# Patient Record
Sex: FEMALE | Race: WHITE | Employment: OTHER | URBAN - METROPOLITAN AREA
[De-identification: names, ages, dates, MRNs, and addresses within clinical notes are randomized per-mention and may not be internally consistent; named-entity substitution may affect disease eponyms.]

---

## 2019-01-17 ENCOUNTER — OFFICE VISIT (OUTPATIENT)
Dept: FAMILY MEDICINE CLINIC | Age: 49
End: 2019-01-17
Payer: COMMERCIAL

## 2019-01-17 VITALS
RESPIRATION RATE: 12 BRPM | SYSTOLIC BLOOD PRESSURE: 136 MMHG | HEIGHT: 65 IN | WEIGHT: 136 LBS | BODY MASS INDEX: 22.66 KG/M2 | HEART RATE: 84 BPM | TEMPERATURE: 98.7 F | DIASTOLIC BLOOD PRESSURE: 82 MMHG

## 2019-01-17 DIAGNOSIS — Z12.31 SCREENING MAMMOGRAM, ENCOUNTER FOR: ICD-10-CM

## 2019-01-17 DIAGNOSIS — N95.1 PERIMENOPAUSAL SYMPTOM: ICD-10-CM

## 2019-01-17 DIAGNOSIS — Z11.4 SCREENING FOR HIV WITHOUT PRESENCE OF RISK FACTORS: ICD-10-CM

## 2019-01-17 DIAGNOSIS — Z00.00 WELL ADULT EXAM: Primary | ICD-10-CM

## 2019-01-17 PROCEDURE — 99386 PREV VISIT NEW AGE 40-64: CPT | Performed by: FAMILY MEDICINE

## 2019-01-17 ASSESSMENT — ENCOUNTER SYMPTOMS
ALLERGIC/IMMUNOLOGIC NEGATIVE: 1
GASTROINTESTINAL NEGATIVE: 1
RESPIRATORY NEGATIVE: 1
EYES NEGATIVE: 1

## 2019-01-17 ASSESSMENT — PATIENT HEALTH QUESTIONNAIRE - PHQ9
SUM OF ALL RESPONSES TO PHQ QUESTIONS 1-9: 0
2. FEELING DOWN, DEPRESSED OR HOPELESS: 0
SUM OF ALL RESPONSES TO PHQ QUESTIONS 1-9: 0
SUM OF ALL RESPONSES TO PHQ9 QUESTIONS 1 & 2: 0
1. LITTLE INTEREST OR PLEASURE IN DOING THINGS: 0

## 2019-01-22 ENCOUNTER — OFFICE VISIT (OUTPATIENT)
Dept: FAMILY MEDICINE CLINIC | Age: 49
End: 2019-01-22
Payer: COMMERCIAL

## 2019-01-22 VITALS
SYSTOLIC BLOOD PRESSURE: 116 MMHG | TEMPERATURE: 98.2 F | BODY MASS INDEX: 24.32 KG/M2 | WEIGHT: 146 LBS | DIASTOLIC BLOOD PRESSURE: 70 MMHG | RESPIRATION RATE: 15 BRPM | HEART RATE: 78 BPM | HEIGHT: 65 IN

## 2019-01-22 DIAGNOSIS — Z01.419 PAP SMEAR, AS PART OF ROUTINE GYNECOLOGICAL EXAMINATION: ICD-10-CM

## 2019-01-22 DIAGNOSIS — Z12.31 ENCOUNTER FOR SCREENING MAMMOGRAM FOR MALIGNANT NEOPLASM OF BREAST: ICD-10-CM

## 2019-01-22 DIAGNOSIS — Z12.4 SCREENING FOR CERVICAL CANCER: ICD-10-CM

## 2019-01-22 DIAGNOSIS — Z01.419 PAP SMEAR, AS PART OF ROUTINE GYNECOLOGICAL EXAMINATION: Primary | ICD-10-CM

## 2019-01-22 PROCEDURE — 99396 PREV VISIT EST AGE 40-64: CPT | Performed by: NURSE PRACTITIONER

## 2019-01-22 ASSESSMENT — ENCOUNTER SYMPTOMS
TROUBLE SWALLOWING: 0
RESPIRATORY NEGATIVE: 1
BLOOD IN STOOL: 0
ABDOMINAL PAIN: 0
ALLERGIC/IMMUNOLOGIC NEGATIVE: 1
VOICE CHANGE: 0
SHORTNESS OF BREATH: 0
GASTROINTESTINAL NEGATIVE: 1
COLOR CHANGE: 0
ANAL BLEEDING: 0
RECTAL PAIN: 0
CONSTIPATION: 0
EYES NEGATIVE: 1
DIARRHEA: 0

## 2019-02-25 ENCOUNTER — OFFICE VISIT (OUTPATIENT)
Dept: FAMILY MEDICINE CLINIC | Age: 49
End: 2019-02-25
Payer: COMMERCIAL

## 2019-02-25 VITALS
SYSTOLIC BLOOD PRESSURE: 120 MMHG | WEIGHT: 137 LBS | TEMPERATURE: 97.6 F | BODY MASS INDEX: 22.02 KG/M2 | RESPIRATION RATE: 16 BRPM | DIASTOLIC BLOOD PRESSURE: 80 MMHG | OXYGEN SATURATION: 98 % | HEIGHT: 66 IN | HEART RATE: 95 BPM

## 2019-02-25 DIAGNOSIS — J10.1 INFLUENZA A: Primary | ICD-10-CM

## 2019-02-25 PROCEDURE — 99213 OFFICE O/P EST LOW 20 MIN: CPT | Performed by: FAMILY MEDICINE

## 2019-02-25 RX ORDER — OSELTAMIVIR PHOSPHATE 75 MG/1
75 CAPSULE ORAL
COMMUNITY
Start: 2019-02-21 | End: 2019-02-26

## 2019-02-25 RX ORDER — MECLIZINE HYDROCHLORIDE 25 MG/1
25 TABLET ORAL 3 TIMES DAILY PRN
Qty: 30 TABLET | Refills: 0 | Status: SHIPPED | OUTPATIENT
Start: 2019-02-25 | End: 2019-03-07

## 2019-02-25 RX ORDER — ALBUTEROL SULFATE 90 UG/1
2 AEROSOL, METERED RESPIRATORY (INHALATION)
COMMUNITY
Start: 2019-02-21 | End: 2019-03-23

## 2019-02-25 RX ORDER — PREDNISONE 20 MG/1
20 TABLET ORAL
COMMUNITY
Start: 2019-02-21 | End: 2019-02-26

## 2019-02-25 ASSESSMENT — ENCOUNTER SYMPTOMS
GASTROINTESTINAL NEGATIVE: 1
RHINORRHEA: 1
COUGH: 1

## 2019-04-05 ENCOUNTER — TELEPHONE (OUTPATIENT)
Dept: FAMILY MEDICINE CLINIC | Age: 49
End: 2019-04-05

## 2020-12-09 LAB
BUN BLDV-MCNC: NORMAL MG/DL
CALCIUM SERPL-MCNC: NORMAL MG/DL
CHLORIDE BLD-SCNC: NORMAL MMOL/L
CO2: NORMAL
CREAT SERPL-MCNC: NORMAL MG/DL
GFR CALCULATED: NORMAL
GLUCOSE BLD-MCNC: NORMAL MG/DL
POTASSIUM SERPL-SCNC: NORMAL MMOL/L
SODIUM BLD-SCNC: NORMAL MMOL/L

## 2023-05-12 PROBLEM — J44.9 CHRONIC OBSTRUCTIVE LUNG DISEASE (MULTI): Status: ACTIVE | Noted: 2023-05-12

## 2023-05-12 PROBLEM — I10 HYPERTENSION: Status: ACTIVE | Noted: 2021-09-22

## 2023-05-12 PROBLEM — R79.89 POSITIVE D DIMER: Status: ACTIVE | Noted: 2021-09-21

## 2023-05-12 PROBLEM — F17.200 TOBACCO USE DISORDER: Status: ACTIVE | Noted: 2023-05-12

## 2023-05-12 PROBLEM — E78.5 HYPERLIPIDEMIA: Status: ACTIVE | Noted: 2023-05-12

## 2023-05-12 PROBLEM — E55.9 VITAMIN D DEFICIENCY: Status: ACTIVE | Noted: 2023-05-12

## 2023-05-12 NOTE — PROGRESS NOTES
Subjective   Patient ID: Kerri Comer is a 52 y.o. female who presents for Joint Pain (Hands and ankles ).    Comes into the office today complaining of global /diffuse arthralgias worst in the hips, knees, ankles and hands but also in the shoulders and in the back.  Generally worse after sitting or resting for little prolonged.  As she is stiffer after resting and they do tend to loosen up after she has been active for about 30 minutes.  She has been utilizing OTC Tylenol and nonsteroidals with slight improvement in symptomatology.    She continues to smoke about the same.    Arthritis  Presents for initial visit. The disease course has been worsening. She complains of pain and stiffness. She reports no joint swelling or joint warmth. Affected locations include the neck, left shoulder, right DIP, left DIP, right MCP, left MCP, right hip, left hip, right knee, left knee, right ankle and left ankle. Her pain is at a severity of 5/10. Associated symptoms include fatigue. Pertinent negatives include no diarrhea, dry eyes, dry mouth, dysuria, fever, pain at night, pain while resting, rash, Raynaud's syndrome, uveitis or weight loss. Her past medical history is significant for chronic back pain and osteoarthritis. There is no history of lupus, psoriasis or rheumatoid arthritis.   Risk factors do not include overuse or scoliosis. Her family medical history includes family history of chronic back pain, family history of osteoarthritis and family history of rheumatoid arthritis. Past treatments include acetaminophen, aspirin, NSAIDs, activity and heat. The treatment provided mild relief. Exacerbated by: Rest-tends to stiffen. Compliance with prior treatments has been good.        Review of Systems   Constitutional:  Positive for fatigue. Negative for activity change, appetite change, chills, diaphoresis, fever, unexpected weight change and weight loss.   HENT:  Negative for congestion, ear pain, hearing loss, nosebleeds,  "postnasal drip, rhinorrhea, sinus pressure, sneezing, sore throat, tinnitus, trouble swallowing and voice change.    Eyes:  Negative for photophobia, pain, discharge, redness, itching and visual disturbance.   Respiratory:  Negative for cough, choking, chest tightness, shortness of breath and wheezing.    Cardiovascular:  Negative for chest pain, palpitations and leg swelling.   Gastrointestinal:  Negative for abdominal distention, abdominal pain, blood in stool, constipation, diarrhea, nausea and vomiting.   Endocrine: Negative for cold intolerance, heat intolerance, polydipsia and polyuria.   Genitourinary:  Negative for dysuria, flank pain, frequency, hematuria and urgency.   Musculoskeletal:  Positive for arthritis and stiffness. Negative for arthralgias, back pain, joint swelling, myalgias, neck pain and neck stiffness.   Skin:  Negative for rash and wound.   Allergic/Immunologic: Negative for immunocompromised state.   Neurological:  Negative for dizziness, tremors, seizures, syncope, facial asymmetry, speech difficulty, weakness, light-headedness, numbness and headaches.   Hematological:  Negative for adenopathy. Does not bruise/bleed easily.   Psychiatric/Behavioral:  Negative for agitation, behavioral problems, confusion, dysphoric mood, hallucinations, self-injury, sleep disturbance and suicidal ideas. The patient is not nervous/anxious.        Objective   BP (!) 138/102 (BP Location: Left arm, Patient Position: Sitting, BP Cuff Size: Adult)   Pulse 89   Temp 36.7 °C (98.1 °F) (Temporal)   Resp 16   Ht 1.6 m (5' 3\")   Wt 62.6 kg (138 lb)   BMI 24.45 kg/m²     Physical Exam  Constitutional:       General: She is not in acute distress.     Appearance: She is not ill-appearing or diaphoretic.   HENT:      Head: Normocephalic and atraumatic.      Right Ear: External ear normal.      Left Ear: External ear normal.      Nose: Nose normal. No rhinorrhea.   Eyes:      General: Lids are normal. No scleral " icterus.        Right eye: No discharge.         Left eye: No discharge.      Conjunctiva/sclera: Conjunctivae normal.   Cardiovascular:      Rate and Rhythm: Normal rate and regular rhythm.      Pulses: Normal pulses.      Heart sounds: No murmur heard.  Pulmonary:      Effort: Pulmonary effort is normal. No respiratory distress.      Breath sounds: No decreased breath sounds, wheezing, rhonchi or rales.   Abdominal:      General: Bowel sounds are normal. There is no distension.      Palpations: Abdomen is soft. There is no mass.      Tenderness: There is no abdominal tenderness. There is no guarding or rebound.   Musculoskeletal:         General: No swelling or tenderness.      Cervical back: No rigidity or tenderness.      Right lower leg: No edema.      Left lower leg: No edema.      Comments: Diffuse arthritic deformities noted   Lymphadenopathy:      Cervical: No cervical adenopathy.      Upper Body:      Right upper body: No supraclavicular adenopathy.      Left upper body: No supraclavicular adenopathy.   Skin:     General: Skin is warm and dry.      Coloration: Skin is not jaundiced or pale.      Findings: No erythema, lesion or rash.   Neurological:      General: No focal deficit present.      Mental Status: She is alert and oriented to person, place, and time.      Sensory: No sensory deficit.      Motor: No weakness or tremor.      Coordination: Coordination normal.      Gait: Gait normal.   Psychiatric:         Mood and Affect: Mood normal. Affect is not inappropriate.         Behavior: Behavior normal.       Assessment/Plan   Diagnoses and all orders for this visit:  Encounter for screening mammogram for malignant neoplasm of breast  -     BI mammo bilateral screening tomosynthesis; Future  -     Follow Up In [facility] - PCP; Future  Chronic obstructive pulmonary disease, unspecified COPD type (CMS/HCC)  -     Follow Up In [facility] - PCP; Future  Arthralgia of multiple  joints  -     AYANA with Reflex to RADHA; Future  -     C-Reactive Protein; Future  -     Creatine Kinase; Future  -     Rheumatoid Factor; Future  -     Sedimentation Rate; Future  -     Uric Acid; Future  -     Vitamin D, Total; Future  -     XR shoulder left 2+ views; Future  -     XR ankle left 3+ views; Future  -     XR knee right 3 views; Future  -     XR hand right 3+ views; Future  -     Follow Up In [facility] - PCP; Future  -     meloxicam (Mobic) 15 mg tablet; Take 1 tablet (15 mg) by mouth once daily.  Tobacco use disorder  -     Follow Up In [facility] - PCP; Future  Primary hypertension  -     CBC and Auto Differential; Future  -     Comprehensive Metabolic Panel; Future  -     Lipid Panel; Future  -     Magnesium; Future  -     TSH with reflex to Free T4 if abnormal; Future  -     Follow Up In [facility] - PCP; Future  Vitamin D deficiency  -     Vitamin D, Total; Future  -     Follow Up In [facility] - PCP; Future    Smoking cessation discussed.  Patient stated understanding and acceptance of risks of continued tobacco use.  Check basic rheumatologic work-up and survey x-rays.  Start meloxicam 15 daily and follow-up in a month to review x-rays and rheumatologic eval

## 2023-05-24 ENCOUNTER — OFFICE VISIT (OUTPATIENT)
Dept: PRIMARY CARE | Facility: CLINIC | Age: 53
End: 2023-05-24
Payer: COMMERCIAL

## 2023-05-24 VITALS
HEART RATE: 89 BPM | BODY MASS INDEX: 24.45 KG/M2 | HEIGHT: 63 IN | TEMPERATURE: 98.1 F | DIASTOLIC BLOOD PRESSURE: 102 MMHG | RESPIRATION RATE: 16 BRPM | WEIGHT: 138 LBS | SYSTOLIC BLOOD PRESSURE: 138 MMHG

## 2023-05-24 DIAGNOSIS — E55.9 VITAMIN D DEFICIENCY: ICD-10-CM

## 2023-05-24 DIAGNOSIS — J44.9 CHRONIC OBSTRUCTIVE PULMONARY DISEASE, UNSPECIFIED COPD TYPE (MULTI): ICD-10-CM

## 2023-05-24 DIAGNOSIS — I10 PRIMARY HYPERTENSION: ICD-10-CM

## 2023-05-24 DIAGNOSIS — M25.50 ARTHRALGIA OF MULTIPLE JOINTS: ICD-10-CM

## 2023-05-24 DIAGNOSIS — Z12.31 ENCOUNTER FOR SCREENING MAMMOGRAM FOR MALIGNANT NEOPLASM OF BREAST: Primary | ICD-10-CM

## 2023-05-24 DIAGNOSIS — F17.200 TOBACCO USE DISORDER: ICD-10-CM

## 2023-05-24 PROCEDURE — 3075F SYST BP GE 130 - 139MM HG: CPT | Performed by: FAMILY MEDICINE

## 2023-05-24 PROCEDURE — 99214 OFFICE O/P EST MOD 30 MIN: CPT | Performed by: FAMILY MEDICINE

## 2023-05-24 PROCEDURE — 3080F DIAST BP >= 90 MM HG: CPT | Performed by: FAMILY MEDICINE

## 2023-05-24 PROCEDURE — 4004F PT TOBACCO SCREEN RCVD TLK: CPT | Performed by: FAMILY MEDICINE

## 2023-05-24 RX ORDER — MELOXICAM 15 MG/1
15 TABLET ORAL DAILY
Qty: 30 TABLET | Refills: 11 | Status: SHIPPED | OUTPATIENT
Start: 2023-05-24 | End: 2024-03-26 | Stop reason: WASHOUT

## 2023-05-24 ASSESSMENT — ENCOUNTER SYMPTOMS
PHOTOPHOBIA: 0
EYE ITCHING: 0
CONSTIPATION: 0
OVERUSE: 0
JOINT WARMTH: 0
WEAKNESS: 0
PAIN AT NIGHT: 0
CHILLS: 0
APPETITE CHANGE: 0
ABDOMINAL DISTENTION: 0
SHORTNESS OF BREATH: 0
EYE PAIN: 0
UNEXPECTED WEIGHT CHANGE: 0
ADENOPATHY: 0
CHOKING: 0
POLYDIPSIA: 0
STIFFNESS: 1
JOINT SWELLING: 0
ARTHRALGIAS: 0
NECK STIFFNESS: 0
HEADACHES: 0
EYE DISCHARGE: 0
FLANK PAIN: 0
FREQUENCY: 0
COUGH: 0
NAUSEA: 0
FATIGUE: 1
TREMORS: 0
VOMITING: 0
LIGHT-HEADEDNESS: 0
WHEEZING: 0
TROUBLE SWALLOWING: 0
BACK PAIN: 0
SLEEP DISTURBANCE: 0
DYSPHORIC MOOD: 0
DIAPHORESIS: 0
FACIAL ASYMMETRY: 0
AGITATION: 0
SINUS PRESSURE: 0
NUMBNESS: 0
WOUND: 0
SORE THROAT: 0
VOICE CHANGE: 0
ACTIVITY CHANGE: 0
BRUISES/BLEEDS EASILY: 0
PAIN WHILE RESTING: 0
MYALGIAS: 0
SEIZURES: 0
WEIGHT LOSS: 0
PALPITATIONS: 0
HEMATURIA: 0
ABDOMINAL PAIN: 0
CONFUSION: 0
EYE REDNESS: 0
HALLUCINATIONS: 0
CHEST TIGHTNESS: 0
NECK PAIN: 0
NERVOUS/ANXIOUS: 0
SPEECH DIFFICULTY: 0
FEVER: 0
DYSURIA: 0
DIARRHEA: 0
BLOOD IN STOOL: 0
RHINORRHEA: 0
DIZZINESS: 0

## 2023-06-05 ENCOUNTER — LAB (OUTPATIENT)
Dept: LAB | Facility: LAB | Age: 53
End: 2023-06-05
Payer: COMMERCIAL

## 2023-06-05 DIAGNOSIS — I10 PRIMARY HYPERTENSION: ICD-10-CM

## 2023-06-05 DIAGNOSIS — M25.50 ARTHRALGIA OF MULTIPLE JOINTS: ICD-10-CM

## 2023-06-05 DIAGNOSIS — E55.9 VITAMIN D DEFICIENCY: ICD-10-CM

## 2023-06-05 LAB
ALANINE AMINOTRANSFERASE (SGPT) (U/L) IN SER/PLAS: 12 U/L (ref 7–45)
ALBUMIN (G/DL) IN SER/PLAS: 4.5 G/DL (ref 3.4–5)
ALKALINE PHOSPHATASE (U/L) IN SER/PLAS: 83 U/L (ref 33–110)
ANION GAP IN SER/PLAS: 9 MMOL/L (ref 10–20)
ASPARTATE AMINOTRANSFERASE (SGOT) (U/L) IN SER/PLAS: 15 U/L (ref 9–39)
BASOPHILS (10*3/UL) IN BLOOD BY AUTOMATED COUNT: 0.05 X10E9/L (ref 0–0.1)
BASOPHILS/100 LEUKOCYTES IN BLOOD BY AUTOMATED COUNT: 0.8 % (ref 0–2)
BILIRUBIN TOTAL (MG/DL) IN SER/PLAS: 0.4 MG/DL (ref 0–1.2)
C REACTIVE PROTEIN (MG/L) IN SER/PLAS: 0.24 MG/DL
CALCIDIOL (25 OH VITAMIN D3) (NG/ML) IN SER/PLAS: 32 NG/ML
CALCIUM (MG/DL) IN SER/PLAS: 9.1 MG/DL (ref 8.6–10.3)
CARBON DIOXIDE, TOTAL (MMOL/L) IN SER/PLAS: 30 MMOL/L (ref 21–32)
CHLORIDE (MMOL/L) IN SER/PLAS: 106 MMOL/L (ref 98–107)
CHOLESTEROL (MG/DL) IN SER/PLAS: 223 MG/DL (ref 0–199)
CHOLESTEROL IN HDL (MG/DL) IN SER/PLAS: 68.1 MG/DL
CHOLESTEROL/HDL RATIO: 3.3
CREATINE KINASE (U/L) IN SER/PLAS: 64 U/L (ref 0–215)
CREATININE (MG/DL) IN SER/PLAS: 0.82 MG/DL (ref 0.5–1.05)
EOSINOPHILS (10*3/UL) IN BLOOD BY AUTOMATED COUNT: 0.11 X10E9/L (ref 0–0.7)
EOSINOPHILS/100 LEUKOCYTES IN BLOOD BY AUTOMATED COUNT: 1.8 % (ref 0–6)
ERYTHROCYTE DISTRIBUTION WIDTH (RATIO) BY AUTOMATED COUNT: 13.5 % (ref 11.5–14.5)
ERYTHROCYTE MEAN CORPUSCULAR HEMOGLOBIN CONCENTRATION (G/DL) BY AUTOMATED: 33 G/DL (ref 32–36)
ERYTHROCYTE MEAN CORPUSCULAR VOLUME (FL) BY AUTOMATED COUNT: 100 FL (ref 80–100)
ERYTHROCYTES (10*6/UL) IN BLOOD BY AUTOMATED COUNT: 4.51 X10E12/L (ref 4–5.2)
GFR FEMALE: 86 ML/MIN/1.73M2
GLUCOSE (MG/DL) IN SER/PLAS: 92 MG/DL (ref 74–99)
HEMATOCRIT (%) IN BLOOD BY AUTOMATED COUNT: 45.1 % (ref 36–46)
HEMOGLOBIN (G/DL) IN BLOOD: 14.9 G/DL (ref 12–16)
IMMATURE GRANULOCYTES/100 LEUKOCYTES IN BLOOD BY AUTOMATED COUNT: 0.2 % (ref 0–0.9)
LDL: 138 MG/DL (ref 0–99)
LEUKOCYTES (10*3/UL) IN BLOOD BY AUTOMATED COUNT: 6.1 X10E9/L (ref 4.4–11.3)
LYMPHOCYTES (10*3/UL) IN BLOOD BY AUTOMATED COUNT: 1.95 X10E9/L (ref 1.2–4.8)
LYMPHOCYTES/100 LEUKOCYTES IN BLOOD BY AUTOMATED COUNT: 32.2 % (ref 13–44)
MAGNESIUM (MG/DL) IN SER/PLAS: 2.16 MG/DL (ref 1.6–2.4)
MONOCYTES (10*3/UL) IN BLOOD BY AUTOMATED COUNT: 0.38 X10E9/L (ref 0.1–1)
MONOCYTES/100 LEUKOCYTES IN BLOOD BY AUTOMATED COUNT: 6.3 % (ref 2–10)
NEUTROPHILS (10*3/UL) IN BLOOD BY AUTOMATED COUNT: 3.56 X10E9/L (ref 1.2–7.7)
NEUTROPHILS/100 LEUKOCYTES IN BLOOD BY AUTOMATED COUNT: 58.7 % (ref 40–80)
PLATELETS (10*3/UL) IN BLOOD AUTOMATED COUNT: 219 X10E9/L (ref 150–450)
POTASSIUM (MMOL/L) IN SER/PLAS: 4.1 MMOL/L (ref 3.5–5.3)
PROTEIN TOTAL: 7.1 G/DL (ref 6.4–8.2)
RHEUMATOID FACTOR (IU/ML) IN SERUM OR PLASMA: <10 IU/ML (ref 0–15)
SEDIMENTATION RATE, ERYTHROCYTE: 4 MM/H (ref 0–30)
SODIUM (MMOL/L) IN SER/PLAS: 141 MMOL/L (ref 136–145)
THYROTROPIN (MIU/L) IN SER/PLAS BY DETECTION LIMIT <= 0.05 MIU/L: 2.05 MIU/L (ref 0.44–3.98)
TRIGLYCERIDE (MG/DL) IN SER/PLAS: 83 MG/DL (ref 0–149)
URATE (MG/DL) IN SER/PLAS: 4.5 MG/DL (ref 2.3–6.7)
UREA NITROGEN (MG/DL) IN SER/PLAS: 13 MG/DL (ref 6–23)
VLDL: 17 MG/DL (ref 0–40)

## 2023-06-05 PROCEDURE — 85025 COMPLETE CBC W/AUTO DIFF WBC: CPT

## 2023-06-05 PROCEDURE — 86431 RHEUMATOID FACTOR QUANT: CPT

## 2023-06-05 PROCEDURE — 84443 ASSAY THYROID STIM HORMONE: CPT

## 2023-06-05 PROCEDURE — 36415 COLL VENOUS BLD VENIPUNCTURE: CPT

## 2023-06-05 PROCEDURE — 85652 RBC SED RATE AUTOMATED: CPT

## 2023-06-05 PROCEDURE — 86140 C-REACTIVE PROTEIN: CPT

## 2023-06-05 PROCEDURE — 82306 VITAMIN D 25 HYDROXY: CPT

## 2023-06-05 PROCEDURE — 83735 ASSAY OF MAGNESIUM: CPT

## 2023-06-05 PROCEDURE — 80053 COMPREHEN METABOLIC PANEL: CPT

## 2023-06-05 PROCEDURE — 82550 ASSAY OF CK (CPK): CPT

## 2023-06-05 PROCEDURE — 80061 LIPID PANEL: CPT

## 2023-06-05 PROCEDURE — 84550 ASSAY OF BLOOD/URIC ACID: CPT

## 2023-09-18 ENCOUNTER — OFFICE VISIT (OUTPATIENT)
Dept: PRIMARY CARE | Facility: CLINIC | Age: 53
End: 2023-09-18
Payer: COMMERCIAL

## 2023-09-18 VITALS
BODY MASS INDEX: 23.46 KG/M2 | DIASTOLIC BLOOD PRESSURE: 86 MMHG | WEIGHT: 132.4 LBS | HEIGHT: 63 IN | TEMPERATURE: 97.2 F | RESPIRATION RATE: 16 BRPM | HEART RATE: 86 BPM | OXYGEN SATURATION: 95 % | SYSTOLIC BLOOD PRESSURE: 140 MMHG

## 2023-09-18 DIAGNOSIS — R05.9 COUGH IN ADULT: ICD-10-CM

## 2023-09-18 DIAGNOSIS — J40 BRONCHITIS: Primary | ICD-10-CM

## 2023-09-18 PROBLEM — N95.1 MENOPAUSAL SYMPTOM: Status: ACTIVE | Noted: 2023-09-18

## 2023-09-18 PROBLEM — J30.9 ALLERGIC RHINITIS: Status: ACTIVE | Noted: 2023-09-18

## 2023-09-18 PROBLEM — F17.200 NICOTINE USE DISORDER: Status: ACTIVE | Noted: 2021-09-22

## 2023-09-18 PROBLEM — N95.1 PERIMENOPAUSAL SYMPTOM: Status: ACTIVE | Noted: 2019-01-17

## 2023-09-18 PROCEDURE — 99213 OFFICE O/P EST LOW 20 MIN: CPT | Performed by: NURSE PRACTITIONER

## 2023-09-18 RX ORDER — AZITHROMYCIN 250 MG/1
TABLET, FILM COATED ORAL
Qty: 6 TABLET | Refills: 0 | Status: SHIPPED | OUTPATIENT
Start: 2023-09-18 | End: 2023-09-23

## 2023-09-18 RX ORDER — METHYLPREDNISOLONE 4 MG/1
TABLET ORAL
Qty: 21 TABLET | Refills: 0 | Status: SHIPPED | OUTPATIENT
Start: 2023-09-18 | End: 2023-09-25

## 2023-09-18 ASSESSMENT — ENCOUNTER SYMPTOMS
HEADACHES: 1
SHORTNESS OF BREATH: 0
SINUS PAIN: 0
WEIGHT LOSS: 1
BLOATING: 1
CHILLS: 0
SORE THROAT: 0
FEVER: 0
COUGH: 1
NAUSEA: 0
WHEEZING: 0
BLOOD IN STOOL: 0
VOMITING: 0
FLATUS: 1
SINUS PRESSURE: 0
RHINORRHEA: 1
DIARRHEA: 1

## 2023-09-18 ASSESSMENT — PATIENT HEALTH QUESTIONNAIRE - PHQ9
1. LITTLE INTEREST OR PLEASURE IN DOING THINGS: NOT AT ALL
2. FEELING DOWN, DEPRESSED OR HOPELESS: NOT AT ALL
SUM OF ALL RESPONSES TO PHQ9 QUESTIONS 1 AND 2: 0

## 2023-09-18 NOTE — PROGRESS NOTES
Subjective   Patient ID: Kerri Comer is a 53 y.o. female who presents for Diarrhea and URI.    Diarrhea   This is a new problem. The current episode started 1 to 4 weeks ago. The problem occurs 5 to 10 times per day. The problem has been unchanged. The stool consistency is described as Mucous. The patient states that diarrhea does not awaken her from sleep. Associated symptoms include bloating, coughing, headaches, increased flatus, a URI and weight loss. Pertinent negatives include no chills, fever or vomiting. Nothing aggravates the symptoms. She has tried increased fluids for the symptoms. The treatment provided no relief.   URI   This is a new problem. Associated symptoms include coughing, diarrhea, headaches and rhinorrhea. Pertinent negatives include no nausea, sinus pain, sore throat, vomiting or wheezing. She has tried decongestant for the symptoms. The treatment provided mild relief.    53-year-old female presents today complaining of a productive cough and mild nasal congestion that has been persisting for the last 4 weeks.  She states that she is also been experiencing a very small amount of mucousy diarrhea for the same amount of time.  She denies any sore throat.  No fever or chills.  She does get some abdominal cramping with the diarrhea, but no abdominal pain.  No chest pain or trouble breathing.  She denies any ill contacts.  She has been taking Coricidin with little relief.  She does report a history of pneumonia.    Review of Systems   Constitutional:  Positive for weight loss. Negative for chills and fever.   HENT:  Positive for rhinorrhea. Negative for sinus pressure, sinus pain and sore throat.    Respiratory:  Positive for cough. Negative for shortness of breath and wheezing.    Gastrointestinal:  Positive for bloating, diarrhea and flatus. Negative for blood in stool, nausea and vomiting.   Neurological:  Positive for headaches.       Objective   /86   Pulse 86   Temp 36.2 °C (97.2  "°F) (Temporal)   Resp 16   Ht 1.6 m (5' 3\")   Wt 60.1 kg (132 lb 6.4 oz)   SpO2 95%   BMI 23.45 kg/m²     Physical Exam  Vitals and nursing note reviewed.   Constitutional:       General: She is not in acute distress.     Appearance: Normal appearance.   HENT:      Right Ear: Tympanic membrane normal.      Left Ear: Tympanic membrane normal.      Nose: Congestion present.      Mouth/Throat:      Pharynx: No oropharyngeal exudate or posterior oropharyngeal erythema.   Eyes:      Conjunctiva/sclera: Conjunctivae normal.   Cardiovascular:      Rate and Rhythm: Normal rate and regular rhythm.   Pulmonary:      Effort: Pulmonary effort is normal.      Breath sounds: Normal breath sounds. No wheezing, rhonchi or rales.   Abdominal:      General: Abdomen is flat. Bowel sounds are normal.      Palpations: Abdomen is soft.      Tenderness: There is no abdominal tenderness.   Lymphadenopathy:      Cervical: No cervical adenopathy.   Neurological:      Mental Status: She is alert.   Psychiatric:         Mood and Affect: Mood normal.         Assessment/Plan   Problem List Items Addressed This Visit    None  Visit Diagnoses       Bronchitis    -  Primary    Relevant Medications    azithromycin (Zithromax) 250 mg tablet    Cough in adult        Relevant Medications    methylPREDNISolone (Medrol Dospak) 4 mg tablets        Bronchitis: Will treat with Zithromax and Medrol Dosepak.  Encouraged probiotic to see if that helps with diarrhea.  Follow-up with your primary care provider in 1 week with any persisting symptoms, or sooner with any additional concerns.       "

## 2023-09-18 NOTE — PATIENT INSTRUCTIONS
Group Topic:  Check-in/Symptom Rating    Date: 6/22/2020  Start Time: 0830  End Time: 0900  Facilitators: Rona Prabhakar LCSW    Focus: Check In group  Number in attendance: 6    Method: Group  Attendance: Present  Participation: Active  Patient Response: Attentive, Demonstrated insight and Good eye contact  Mood: Anxious  Affect: Type: Anxious   Range: Full (normal)   Congruency: Congruent   Stability: Stable  Behavior/Socialization: Appropriate to group, Cooperative and Engaged  Thought Process: Focused  Task Performance: Follows directions  Patient Evaluation: Independent - full participation     Pt rates all her symptoms a zero to day with the exception of her anxiety rating at a 10, which may have been prompted by an outburst of another group member during group.  Pt feels prepared for D/C today.    NELLI Anders, MAGUI, SAC   Today you were seen for bronchitis.  Start antibiotics as directed and take until gone.  You have been prescribed a short burst of steroids. Start taking a probiotic (culturelle)  Follow-up with your primary care provider in 1 week with any persisting symptoms, or sooner with any additional concerns.  If developing any chest pain, trouble breathing, bluish color around the lips, confusion or lethargy, please go to emergency department for further evaluation or call 911.

## 2023-11-06 ENCOUNTER — APPOINTMENT (OUTPATIENT)
Dept: PRIMARY CARE | Facility: CLINIC | Age: 53
End: 2023-11-06
Payer: COMMERCIAL

## 2024-02-20 ENCOUNTER — OFFICE VISIT (OUTPATIENT)
Dept: ORTHOPEDIC SURGERY | Facility: CLINIC | Age: 54
End: 2024-02-20
Payer: COMMERCIAL

## 2024-02-20 VITALS — WEIGHT: 136 LBS | BODY MASS INDEX: 23.22 KG/M2 | HEIGHT: 64 IN

## 2024-02-20 DIAGNOSIS — G56.01 CARPAL TUNNEL SYNDROME OF RIGHT WRIST: ICD-10-CM

## 2024-02-20 DIAGNOSIS — M65.30 ACQUIRED TRIGGER FINGER: Primary | ICD-10-CM

## 2024-02-20 PROBLEM — M65.311 TRIGGER FINGER OF RIGHT THUMB: Status: ACTIVE | Noted: 2024-02-20

## 2024-02-20 PROCEDURE — 99214 OFFICE O/P EST MOD 30 MIN: CPT | Performed by: ORTHOPAEDIC SURGERY

## 2024-02-20 PROCEDURE — 99204 OFFICE O/P NEW MOD 45 MIN: CPT | Performed by: ORTHOPAEDIC SURGERY

## 2024-02-20 ASSESSMENT — PAIN - FUNCTIONAL ASSESSMENT: PAIN_FUNCTIONAL_ASSESSMENT: 0-10

## 2024-02-20 ASSESSMENT — PAIN SCALES - GENERAL: PAINLEVEL_OUTOF10: 5 - MODERATE PAIN

## 2024-02-20 NOTE — PROGRESS NOTES
2/20/2024    Chief Complaint   Patient presents with    Right Thumb - New Patient Visit     1. Right trigger thumb x 1 month        History of Present Illness:  Patient Kerri Comer , 53 y.o. female, presents today, 2/20/2024, for evaluation of right thumb  pain and mechanical triggering, ongoing about 1 month .  Stephanie is a right-hand-dominant individual.  She denies any discrete injury or trauma.  She feels that symptoms been gradually worsening over time.  She has tried bracing, over-the-counter NSAIDs, icing without much relief.  She is inquiring about surgical options.  She also describes chronic numbness tingling to median nerve distribution of the right hand primarily affecting thumb and index finger.  This occasionally wakes her from sleep at nighttime.       Review of Systems:   GENERAL: Negative  GI: Negative  MUSCULOSKELETAL: See HPI  SKIN: Negative  NEURO:  Negative     Physical Exam:  GENERAL:  Alert and oriented to person, place, and time.  No acute distress and breathing comfortably; pleasant and cooperative with the examination.  HEENT:  Head is normocephalic and atraumatic.  NECK:  Supple, no visible swelling.  CARDIOVASCULAR:  No palpable tachycardia.  LUNGS:  No audible wheezing or labored breathing.  ABDOMEN:  Nondistended.  Extremities: Evaluation of the right upper extremity finds the patient to have a palpable radial artery at the wrist with brisk capillary refill to all digits. The patient has intact sensorium to axillary, radial, median and ulnar nerves. There are no open wounds. There are no signs of infection. There is no evidence of lymphedema or lymphatic streaking. The patient has supple compartments of the right arm, forearm and hand.  Tenderness palpation overlying A1 pulley of the right thumb.  Mechanical triggering noted digital flexion and extension.  Positive Tinel's over the right median nerve.     Imaging/Test Results:  None today.     Assessment:  Right trigger thumb,  right carpal tunnel syndrome recalcitrant to nonoperative treatment strategies.     Plan:  Operative and nonoperative treatment strategies were reviewed.  Patient would like to forego any additional nonoperative management favor surgical intervention.  Will plan for right trigger thumb release with concomitant right carpal tunnel release under wide-awake approach to anesthesia.  Risk benefits and alternatives to surgery were discussed. A long discussion with the patient regarding right carpal tunnel release and right trigger thumb release.  At this point, I discussed the risks/benefits/expected outcomes of observation versus surgery.  The risks/benefits of surgery were reviewed. The risks include, but are not limited to, infection, bleeding, nerve/vessel injury, stiffness, arthritis and anaesthetic complications. I have answered all questions regarding the surgery itself and the post-operative course. The patient consented to surgery.  Follow-up with our office 10 to 14 days postop.    In a face to face encounter, I performed a history and physical examination, discussed pertinent diagnostic studies if indicated, and discussed diagnosis and management strategies with both the patient and the mid-level provider. I reviewed the mid-level's note and agree with the documented findings and plan of care.      Patient presents today for evaluation of symptoms compatible with right carpal tunnel syndrome and right trigger thumb.  He has symptoms of numbness and tingling that has been worsening over time.  They wake her from sleep at nighttime.  Bracing has proved ineffective.  She also has focal tenderness to the right thumb at A1 pulley with mechanical triggering.  Exam shows positive Tinel's over course of median nerve to the right wrist along with positive dry compression test and Phalen's maneuver.  Focal tenderness to the right thumb at A1 pulley with mechanical triggering.  Treatment options were discussed including  both operative and nonoperative strategies.  Patient elects to proceed forth with surgery by way of right carpal tunnel release and right trigger thumb release.  Plan for wide-awake approach to anesthesia.  Patient is agreeable with this strategy.

## 2024-02-26 NOTE — DISCHARGE INSTRUCTIONS
Medication given may have significant effects after discharge. Therefore on the day of surgery:  1) You must be accompanied by a responsible adult upon discharge and for 24 hours after surgery.  Do not drive a motor vehicle, operate machinery, power tools or appliance, drink alcoholic beverages, or make critical decisions for 24 hours.  2) Be aware of dizziness, which may cause a fall. Change positions slowly.  3) Eating: you may resume your regular diet but it is better to increase intake slowly with mild foods and working up to your regular diet. No greasy, fried or spicy foods today.  4) Nausea/Vomiting: Nausea and vomiting may occur as you become more active or begin to increase food intake. If this should happen, decrease activity and return to liquids. If the problem persists, call your surgeon  5) Pain: Your surgeon may have given you a prescription for pain medication. Take pain medication with food as prescribed. Pain medication may cause constipation, so drink plenty of fluids. If your pain medication does not provide adequate relief, call your surgeon  6) Urinating: Notify your surgeon if you have not urinated within 12 hours after discharge  7) Ice: Apply ice to operative site for 20 min 5-6 times a day or use Polar care as instructed  8) Dressing:   [x]  Remove dressing in 3 Days   [x]  Leave open to air or apply simple Band-Aid after initial dressing is taken off and incision is dry. (If Steri-Strips are applied, leave them in place.)   [x]  No baths, hots tubs, pools, or submerging in fresh water sources. Okay to begin showering and normal hand washing after dressing removal.     []  Leave dressing in place. Keep dressing/ incision clean and dry.      9) Activity:    Shoulder/ Elbow/Hand:   [x]  Elevate extremity    []  Sling   [] At all times (except for exercises and showering)  [] As needed only for comfort   [] Begin daily motion exercises out of sling as instructed   [x]  Bend and flex  fingers/wrist/elbow frequently   [] Non-weight bearing/No lifting/gripping/squeezing to the surgical limb   [x] No lifting greater than 1 lb until follow-up visit      10) Begin physical therapy if advised by your physician:   [] Before returning to see you doctor    [x] Will discuss possible need at follow-up visit   [] Will be paired with your follow-up visit in [place]    11) Call your doctor at [phone] for an appointment (or follow up as scheduled)    Contact [facility] office if  Increased redness, swelling, drainage of any kind, and/or pain to surgery site.  As well as new onset fevers and or chills.  These could signify an infection.  Calf or thigh tenderness to touch as well as increased swelling or redness.  This could signify a clot formation.  Numbness or tingling to an area around the incision site or below the incision site (toes). Or if the operative extremity becomes cold, blue.  Any rash appears, increased  or new onset nausea/vomiting occur.  This may indicate a reaction to a medication.  Temp is 38.5 C (101F)  12) If you have any concerns or questions, please call [facility] surgeon on call. The 24- hour phone is [phone]  13) If you are unable to contact your surgeon, in an emergency situation, go to the nearest hospital

## 2024-03-11 RX ORDER — HYDROCODONE BITARTRATE AND ACETAMINOPHEN 5; 325 MG/1; MG/1
1 TABLET ORAL EVERY 8 HOURS PRN
Qty: 6 TABLET | Refills: 0 | Status: SHIPPED | OUTPATIENT
Start: 2024-03-11 | End: 2024-03-13

## 2024-03-13 ENCOUNTER — ANESTHESIA (OUTPATIENT)
Dept: OPERATING ROOM | Facility: HOSPITAL | Age: 54
End: 2024-03-13
Payer: COMMERCIAL

## 2024-03-13 ENCOUNTER — ANESTHESIA EVENT (OUTPATIENT)
Dept: OPERATING ROOM | Facility: HOSPITAL | Age: 54
End: 2024-03-13
Payer: COMMERCIAL

## 2024-03-13 ENCOUNTER — HOSPITAL ENCOUNTER (OUTPATIENT)
Facility: HOSPITAL | Age: 54
Setting detail: OUTPATIENT SURGERY
Discharge: HOME | End: 2024-03-13
Attending: ORTHOPAEDIC SURGERY | Admitting: ORTHOPAEDIC SURGERY
Payer: COMMERCIAL

## 2024-03-13 VITALS
HEART RATE: 70 BPM | WEIGHT: 136 LBS | HEIGHT: 64 IN | RESPIRATION RATE: 18 BRPM | BODY MASS INDEX: 23.22 KG/M2 | OXYGEN SATURATION: 99 % | SYSTOLIC BLOOD PRESSURE: 158 MMHG | DIASTOLIC BLOOD PRESSURE: 78 MMHG | TEMPERATURE: 98.6 F

## 2024-03-13 DIAGNOSIS — M65.311 TRIGGER FINGER OF RIGHT THUMB: ICD-10-CM

## 2024-03-13 DIAGNOSIS — G89.18 POST-OP PAIN: Primary | ICD-10-CM

## 2024-03-13 DIAGNOSIS — G56.01 CARPAL TUNNEL SYNDROME, RIGHT UPPER LIMB: ICD-10-CM

## 2024-03-13 LAB — HCG UR QL IA.RAPID: NEGATIVE

## 2024-03-13 PROCEDURE — 7100000010 HC PHASE TWO TIME - EACH INCREMENTAL 1 MINUTE: Performed by: ORTHOPAEDIC SURGERY

## 2024-03-13 PROCEDURE — 26055 INCISE FINGER TENDON SHEATH: CPT | Performed by: ORTHOPAEDIC SURGERY

## 2024-03-13 PROCEDURE — A64721 PR REVISE MEDIAN N/CARPAL TUNNEL SURG: Performed by: ANESTHESIOLOGY

## 2024-03-13 PROCEDURE — 3700000001 HC GENERAL ANESTHESIA TIME - INITIAL BASE CHARGE: Performed by: ORTHOPAEDIC SURGERY

## 2024-03-13 PROCEDURE — 64721 CARPAL TUNNEL SURGERY: CPT | Performed by: ORTHOPAEDIC SURGERY

## 2024-03-13 PROCEDURE — 3700000002 HC GENERAL ANESTHESIA TIME - EACH INCREMENTAL 1 MINUTE: Performed by: ORTHOPAEDIC SURGERY

## 2024-03-13 PROCEDURE — 2500000004 HC RX 250 GENERAL PHARMACY W/ HCPCS (ALT 636 FOR OP/ED): Performed by: PHYSICIAN ASSISTANT

## 2024-03-13 PROCEDURE — 7100000009 HC PHASE TWO TIME - INITIAL BASE CHARGE: Performed by: ORTHOPAEDIC SURGERY

## 2024-03-13 PROCEDURE — 3600000008 HC OR TIME - EACH INCREMENTAL 1 MINUTE - PROCEDURE LEVEL THREE: Performed by: ORTHOPAEDIC SURGERY

## 2024-03-13 PROCEDURE — A64721 PR REVISE MEDIAN N/CARPAL TUNNEL SURG: Performed by: NURSE ANESTHETIST, CERTIFIED REGISTERED

## 2024-03-13 PROCEDURE — 81025 URINE PREGNANCY TEST: CPT | Performed by: ORTHOPAEDIC SURGERY

## 2024-03-13 PROCEDURE — 3600000003 HC OR TIME - INITIAL BASE CHARGE - PROCEDURE LEVEL THREE: Performed by: ORTHOPAEDIC SURGERY

## 2024-03-13 PROCEDURE — 2720000007 HC OR 272 NO HCPCS: Performed by: ORTHOPAEDIC SURGERY

## 2024-03-13 PROCEDURE — 2500000004 HC RX 250 GENERAL PHARMACY W/ HCPCS (ALT 636 FOR OP/ED): Performed by: ANESTHESIOLOGY

## 2024-03-13 RX ORDER — SODIUM CHLORIDE, SODIUM LACTATE, POTASSIUM CHLORIDE, CALCIUM CHLORIDE 600; 310; 30; 20 MG/100ML; MG/100ML; MG/100ML; MG/100ML
100 INJECTION, SOLUTION INTRAVENOUS CONTINUOUS
Status: CANCELLED | OUTPATIENT
Start: 2024-03-13

## 2024-03-13 RX ORDER — OXYCODONE HYDROCHLORIDE 10 MG/1
10 TABLET ORAL EVERY 4 HOURS PRN
Status: CANCELLED | OUTPATIENT
Start: 2024-03-13

## 2024-03-13 RX ORDER — ONDANSETRON HYDROCHLORIDE 2 MG/ML
4 INJECTION, SOLUTION INTRAVENOUS ONCE AS NEEDED
Status: CANCELLED | OUTPATIENT
Start: 2024-03-13

## 2024-03-13 RX ORDER — FAMOTIDINE 10 MG/ML
20 INJECTION INTRAVENOUS ONCE
Status: CANCELLED | OUTPATIENT
Start: 2024-03-13 | End: 2024-03-13

## 2024-03-13 RX ORDER — ALBUTEROL SULFATE 0.83 MG/ML
2.5 SOLUTION RESPIRATORY (INHALATION) ONCE AS NEEDED
Status: CANCELLED | OUTPATIENT
Start: 2024-03-13

## 2024-03-13 RX ORDER — DIPHENHYDRAMINE HYDROCHLORIDE 50 MG/ML
12.5 INJECTION INTRAMUSCULAR; INTRAVENOUS ONCE AS NEEDED
Status: CANCELLED | OUTPATIENT
Start: 2024-03-13

## 2024-03-13 RX ORDER — LABETALOL HYDROCHLORIDE 5 MG/ML
5 INJECTION, SOLUTION INTRAVENOUS ONCE AS NEEDED
Status: CANCELLED | OUTPATIENT
Start: 2024-03-13

## 2024-03-13 RX ORDER — MEPERIDINE HYDROCHLORIDE 50 MG/ML
12.5 INJECTION INTRAMUSCULAR; INTRAVENOUS; SUBCUTANEOUS EVERY 10 MIN PRN
Status: CANCELLED | OUTPATIENT
Start: 2024-03-13

## 2024-03-13 RX ORDER — SODIUM CHLORIDE, SODIUM LACTATE, POTASSIUM CHLORIDE, CALCIUM CHLORIDE 600; 310; 30; 20 MG/100ML; MG/100ML; MG/100ML; MG/100ML
100 INJECTION, SOLUTION INTRAVENOUS CONTINUOUS
Status: DISCONTINUED | OUTPATIENT
Start: 2024-03-13 | End: 2024-03-13 | Stop reason: HOSPADM

## 2024-03-13 RX ORDER — LIDOCAINE HYDROCHLORIDE 10 MG/ML
0.1 INJECTION, SOLUTION EPIDURAL; INFILTRATION; INTRACAUDAL; PERINEURAL ONCE
Status: CANCELLED | OUTPATIENT
Start: 2024-03-13 | End: 2024-03-13

## 2024-03-13 RX ORDER — OXYCODONE HYDROCHLORIDE 5 MG/1
5 TABLET ORAL EVERY 4 HOURS PRN
Status: CANCELLED | OUTPATIENT
Start: 2024-03-13

## 2024-03-13 RX ORDER — ACETAMINOPHEN 325 MG/1
975 TABLET ORAL ONCE
Status: CANCELLED | OUTPATIENT
Start: 2024-03-13 | End: 2024-03-13

## 2024-03-13 RX ORDER — HYDRALAZINE HYDROCHLORIDE 20 MG/ML
5 INJECTION INTRAMUSCULAR; INTRAVENOUS EVERY 30 MIN PRN
Status: CANCELLED | OUTPATIENT
Start: 2024-03-13

## 2024-03-13 RX ORDER — MIDAZOLAM HYDROCHLORIDE 1 MG/ML
INJECTION, SOLUTION INTRAMUSCULAR; INTRAVENOUS AS NEEDED
Status: DISCONTINUED | OUTPATIENT
Start: 2024-03-13 | End: 2024-03-13

## 2024-03-13 RX ORDER — METOCLOPRAMIDE HYDROCHLORIDE 5 MG/ML
10 INJECTION INTRAMUSCULAR; INTRAVENOUS ONCE AS NEEDED
Status: CANCELLED | OUTPATIENT
Start: 2024-03-13

## 2024-03-13 RX ADMIN — MIDAZOLAM 2 MG: 1 INJECTION INTRAMUSCULAR; INTRAVENOUS at 07:14

## 2024-03-13 RX ADMIN — SODIUM CHLORIDE, POTASSIUM CHLORIDE, SODIUM LACTATE AND CALCIUM CHLORIDE: 600; 310; 30; 20 INJECTION, SOLUTION INTRAVENOUS at 07:30

## 2024-03-13 ASSESSMENT — COLUMBIA-SUICIDE SEVERITY RATING SCALE - C-SSRS
1. IN THE PAST MONTH, HAVE YOU WISHED YOU WERE DEAD OR WISHED YOU COULD GO TO SLEEP AND NOT WAKE UP?: NO
6. HAVE YOU EVER DONE ANYTHING, STARTED TO DO ANYTHING, OR PREPARED TO DO ANYTHING TO END YOUR LIFE?: NO
2. HAVE YOU ACTUALLY HAD ANY THOUGHTS OF KILLING YOURSELF?: NO

## 2024-03-13 ASSESSMENT — PAIN SCALES - GENERAL
PAINLEVEL_OUTOF10: 0 - NO PAIN

## 2024-03-13 ASSESSMENT — PAIN - FUNCTIONAL ASSESSMENT
PAIN_FUNCTIONAL_ASSESSMENT: 0-10

## 2024-03-13 NOTE — OP NOTE
RIGHT CARPAL TUNNEL RELEASE, RIGHT THUMB TRIGGER FINGER RELEASE (R) Operative Note     Date: 3/13/2024  OR Location: [facility] OR    Name: Kerri Comer : 1970, Age: 53 y.o., MRN: [id number], Sex: female    Preoperative diagnosis: Right carpal tunnel syndrome.  Right trigger thumb.    Postoperative diagnosis: Right carpal tunnel syndrome.  Right trigger thumb.    Procedure planned: Right carpal tunnel release.  Right trigger thumb release.    Procedure performed: Right carpal tunnel release.  Right trigger thumb release.    Surgeon: Mario Quiroz D.O.    Assistant:JUDIT Art  The physician assistant was present to the entire case. Given the nature of the disease process and the procedure to be performed a skilled surgical assistant was necessary during the case. The assistant was necessary in order to hold retractors and directly assist in the operation. A certified scrub tech was at the back table managing instruments and supplies for the surgical case.    Anesthesia: Local field block using lidocaine with epinephrine solution and monitored by the anesthesia team    Estimated blood loss: Less than 10 mL    Drains: None    Tourniquet: None    Specimens: None    Implants: None    Indications: The patient presented to the office with subjective symptoms physical examination an EMG nerve conduction study test consistent with right carpal tunnel syndrome.  The patient also had painful triggering of the right thumb.  The patient had failure of nonoperative treatment strategies.  After full discussion regarding risks benefits and alternatives the patient elected to forego any additional nonoperative management in favor of right carpal tunnel release with right trigger thumb release.  Informed consent was signed and placed in the chart.    Complications: None noted at the time of surgery    Description of operation: The patient was taken to the operative suite and placed in the supine position on the operating  table.  A timeout was performed and the right hand was confirmed to be the operative site.  The patient was carefully positioned on the table in such a fashion as to pad all bony prominences and peripheral nerves.  The patient was then prepped and draped in the normal sterile fashion.  After prepping and draping a longitudinal incision was marked out directly overlying the transverse carpal ligament in line with the ring finger ray.  Forceps were used to gently grasp and pinch the skin in the zone of intended surgical dissection to check for adequate anesthesia.  After confirming adequate anesthesia the 15 blade was used to incise skin and dissect down to the subcutaneous plane.  The palmar aponeurosis was divided in line with the incision to expose the transverse carpal ligament.  The transverse carpal ligament was then meticulously divided under direct visualization, working from distal to proximal, taking care to avoid iatrogenic injury to the underlying contents of the carpal tunnel.  The tenotomy scissors were used to release the most proximal fibers of the transverse carpal ligament along with the most distal fibers of the antebrachial fascia.  This was done under direct visualization.  The distal release of the transverse carpal ligament was carried to the level of the fat change.  Direct inspection and digital palpation confirmed complete release of the carpal tunnel.  Attention was then turned to the right trigger thumb release.  The 15 blade was used to incise skin transversely over MCP flexion crease.  Dissection was carried through the subcutaneous plane protecting neurovascular bundles as we exposed the A1 pulley.  Combination of 15 blade and tenotomy scissors were used to release the A1 pulley in the midline under direct visualization.  Active range of motion demonstrated nice smooth gliding without evidence for triggering.  Copious irrigation was performed.  Interrupted nylon stitches were used to  close the skin.  A bulky nonadherent dressing was placed.  The patient was then allowed to head to recovery in stable condition.  Overall the patient tolerated the procedure well.    Disposition: Stable to PACU              Mario Quiroz  Phone Number: [phone]

## 2024-03-13 NOTE — ANESTHESIA PREPROCEDURE EVALUATION
Patient: Kerri Comer    Procedure Information       Date/Time: 03/13/24 0730    Procedure: RIGHT CARPAL TUNNEL RELEASE, RIGHT THUMB TRIGGER FINGER RELEASE (Right: Wrist) - WIDE AWAKE BLOCK DONE BY ANESTHESIA TEAM    Location: [facility] OR 03 / Virtual [facility] OR    Surgeons: Mario Quiroz, DO            Relevant Problems   Cardiovascular   (+) Hyperlipidemia   (+) Hypertension      Neuro/Psych   (+) Carpal tunnel syndrome, right upper limb      Pulmonary   (+) Chronic obstructive lung disease (CMS/HCC)      Musculoskeletal   (+) Carpal tunnel syndrome, right upper limb       Clinical information reviewed:   Tobacco  Allergies  Meds   Med Hx  Surg Hx   Fam Hx  Soc Hx        NPO Detail:  NPO/Void Status  Carbohydrate Drink Given Prior to Surgery? : N  Date of Last Liquid: 03/12/24  Time of Last Liquid: 2200  Date of Last Solid: 03/12/24  Time of Last Solid: 2200  Last Intake Type: Food  Time of Last Void: 0500         Physical Exam    Airway  Mallampati: II     Cardiovascular   Rhythm: regular  Rate: normal     Dental - normal exam     Pulmonary   Breath sounds clear to auscultation     Abdominal        Anesthesia Plan    History of general anesthesia?: yes  History of complications of general anesthesia?: no    ASA 2     regional     intravenous induction   Anesthetic plan and risks discussed with patient.    Plan discussed with CRNA.

## 2024-03-13 NOTE — ANESTHESIA PROCEDURE NOTES
Peripheral Block    Patient location during procedure: pre-op  Start time: 3/13/2024 7:13 AM  End time: 3/13/2024 7:15 AM  Reason for block: at surgeon's request and post-op pain management  Staffing  Performed: attending   Authorized by: Solis Devlin DO    Performed by: Solis Devlin DO  Preanesthetic Checklist  Completed: patient identified, IV checked, site marked, risks and benefits discussed, surgical consent, monitors and equipment checked, pre-op evaluation and timeout performed   Timeout performed at: 3/13/2024 7:13 AM  Peripheral Block  Patient position: laying flat  Prep: ChloraPrep  Patient monitoring: heart rate  Laterality: right  Injection technique: single-shot  Local infiltration: lidocaine  Infiltration strength: 1 %  Dose: 25 mL  Assessment  Injection assessment: negative aspiration for heme, no paresthesia on injection and incremental injection  Additional Notes  Right median nerve block  Right digitial block thumb

## 2024-03-13 NOTE — ANESTHESIA POSTPROCEDURE EVALUATION
Patient: Kerri Comer    Procedure Summary       Date: 03/13/24 Room / Location: [facility] OR 03 / Virtual [facility] OR    Anesthesia Start: 0730 Anesthesia Stop: 0755    Procedure: RIGHT CARPAL TUNNEL RELEASE, RIGHT THUMB TRIGGER FINGER RELEASE (Right: Wrist) Diagnosis:       Carpal tunnel syndrome, right upper limb      Trigger finger of right thumb      (Carpal tunnel syndrome, right upper limb [G56.01])      (Trigger finger of right thumb [M65.311])    Surgeons: Mario Quiroz DO Responsible Provider: Solis Devlin DO    Anesthesia Type: regional ASA Status: 2            Anesthesia Type: regional    Vitals Value Taken Time   /85 03/13/24 0757   Temp 36.7 03/13/24 0757   Pulse 73 03/13/24 0757   Resp 14 03/13/24 0757   SpO2 95 03/13/24 0757       Anesthesia Post Evaluation    Patient location during evaluation: PACU  Patient participation: complete - patient participated  Level of consciousness: awake and alert  Pain management: adequate  Airway patency: patent  Cardiovascular status: acceptable and blood pressure returned to baseline  Respiratory status: acceptable, room air, spontaneous ventilation and nonlabored ventilation  Hydration status: acceptable  Postoperative Nausea and Vomiting: none  Comments: Handoff to Baljinder PACU RN in phaae 2        No notable events documented.

## 2024-03-26 ENCOUNTER — OFFICE VISIT (OUTPATIENT)
Dept: ORTHOPEDIC SURGERY | Facility: CLINIC | Age: 54
End: 2024-03-26
Payer: COMMERCIAL

## 2024-03-26 DIAGNOSIS — M65.30 ACQUIRED TRIGGER FINGER: Primary | ICD-10-CM

## 2024-03-26 DIAGNOSIS — G56.01 CARPAL TUNNEL SYNDROME OF RIGHT WRIST: ICD-10-CM

## 2024-03-26 PROCEDURE — 99024 POSTOP FOLLOW-UP VISIT: CPT | Performed by: ORTHOPAEDIC SURGERY

## 2024-03-26 NOTE — PROGRESS NOTES
3/26/2024    Chief Complaint   Patient presents with    Right Hand - Post-op     Rt CR, Rt trigger thumb release  DOS: 3/13/24       History of Present Illness:  Patient Kerri Comer , 53 y.o. female, presents today, 3/26/2024, for evaluation of right hand  carpal tunnel release with right trigger thumb release, 2 weeks out .  Numbness and tingling as well as mechanical symptoms of thumb triggering have resolved.  Minimal soreness discomfort.  She has some persistence of stiffness especially with thumb range of motion but this continues to improve daily.  Denies any fevers, chills, constitutional symptoms.       Review of Systems:   GENERAL: Negative  GI: Negative  MUSCULOSKELETAL: See HPI  SKIN: Negative  NEURO:  Negative     Physical Exam:  GENERAL:  Alert and oriented to person, place, and time.  No acute distress and breathing comfortably; pleasant and cooperative with the examination.  HEENT:  Head is normocephalic and atraumatic.  NECK:  Supple, no visible swelling.  CARDIOVASCULAR:  No palpable tachycardia.  LUNGS:  No audible wheezing or labored breathing.  ABDOMEN:  Nondistended.  Extremities: The surgical incision is clean, dry, intact, and appears to be healing well.  No active bleeding, erythema, warmth, drainage, or signs of infection.  Appropriate functional ROM demonstrated with flexion/extension of the digits, and flexion/extension/pronosupination of the wrist.     Imaging/Test Results:  None today.     Assessment:  Right carpal tunnel release with right trigger thumb release, 2 weeks out with resolution of numbness and tingling and mechanical symptoms respectively.     Plan:  Sutures were removed in the office today.  The patient can begin to weight bear as tolerated.  We discussed and reviewed home exercise program for range of motion recovery, scar massage, and desensitization techniques.  They can return to activities as tolerated.  The patient will follow-up with our office on an as  needed basis.  All patient questions answered at today's visit.    Reshma Rosen PA-C

## 2024-04-11 ENCOUNTER — EVALUATION (OUTPATIENT)
Dept: OCCUPATIONAL THERAPY | Facility: CLINIC | Age: 54
End: 2024-04-11
Payer: COMMERCIAL

## 2024-04-11 DIAGNOSIS — G56.01 CARPAL TUNNEL SYNDROME, RIGHT UPPER LIMB: ICD-10-CM

## 2024-04-11 DIAGNOSIS — M65.30 ACQUIRED TRIGGER FINGER: ICD-10-CM

## 2024-04-11 DIAGNOSIS — G56.01 CARPAL TUNNEL SYNDROME OF RIGHT WRIST: Primary | ICD-10-CM

## 2024-04-11 PROCEDURE — 97110 THERAPEUTIC EXERCISES: CPT | Mod: GO | Performed by: OCCUPATIONAL THERAPIST

## 2024-04-11 PROCEDURE — 97140 MANUAL THERAPY 1/> REGIONS: CPT | Mod: GO | Performed by: OCCUPATIONAL THERAPIST

## 2024-04-11 PROCEDURE — 97165 OT EVAL LOW COMPLEX 30 MIN: CPT | Mod: GO | Performed by: OCCUPATIONAL THERAPIST

## 2024-04-11 ASSESSMENT — PAIN SCALES - GENERAL: PAINLEVEL_OUTOF10: 8

## 2024-04-11 ASSESSMENT — PAIN DESCRIPTION - DESCRIPTORS: DESCRIPTORS: SHARP;BURNING;DISCOMFORT

## 2024-04-11 ASSESSMENT — PAIN - FUNCTIONAL ASSESSMENT: PAIN_FUNCTIONAL_ASSESSMENT: 0-10

## 2024-04-11 NOTE — PROGRESS NOTES
Occupational Therapy   Upper Extremity Evaluation Note    Patient Name: Kerri Comer  MRN: [id number]  Referring  Physician: Dr. Mario Quiroz  Time Calculation Time Calculation  Start Time: 1020  Stop Time: 1100  Time Calculation (min): 40 min     Current Problem  1. Carpal tunnel syndrome of right wrist  Referral to Occupational Therapy      2. Acquired trigger finger  Referral to Occupational Therapy      3. Carpal tunnel syndrome, right upper limb  Follow Up In Occupational Therapy          Date of Injury: 1/1/2008  Date of Surgery: 3/13/24    Precautions: HTN  Allergies:             four    Insurance    Visit number: 1/8   [employer]  60V PT OT ST   COPAY 0  DED 0 COVERAGE 100 OOP [phone]   NO AUTH REQ       Subjective  Patient would like to functionally improve opening jars, zip lock bags, sariah-care, and squeezing bottles.     Pain  Pain Assessment  Pain Assessment: 0-10  Pain Score: 8  Pain Descriptors: Sharp, Burning, Discomfort        Objective  Hand Dominance: RIGHT    Outcome Measures:   DASH Score: 50.00    ADLS/IADLS: MOD IND    Skin/ Wound/Scar: Scar flat, healing well. Tender as expected for stage of recovery.     Sensation: WFL    Dexterity/ Coordination: Monitor        AROM  Right Left   Wrist Extension/Flexion 50/60 -    Radial Deviation/Ulnar Deviation 15/25 -         Hand ROM  AROM   THUMB      Kapandji 5/10    Palmar Abduction TBE    Radial Abduction TBE     Finger (ADPC)        Index Middle Ring Small    1.0 1.0 1.0 1.0        Finger   MCP PIP DIP    Index - - -    Middle - - -    Ring - - -    Small - - -     Hand Strength  deferred   Gurmeet Dynamometer    Gross Grasp (lbs)  Right Left   2nd setting - -       Pinch Strength Right Left   Lateral - -   Tripod - -   Tip  - -       Edema (cm):    Right Left   WRIST DWC 15.5 14.5           Treatment: 40 min  Low Evaluation 15 minutes  Paraffin 97972 time minutes, 0  Manual Therapy (04267): timed minutes 10  Therapeutic exercise  (11345): timed minutes 15  Therapeutic Activity (20250): timed minutes 0  Neuromuscular Re-education (59985): timed minutes 0  Self care/home management (5843258): timed minutes 0      -AROM forearm, wrist, and hand including tendon glides  -Edema Management: breath work, putty rolls, IMAK (M) at night  -Scar Soft Tissue Mobilization using Dycem techniques of target tissues       -Yellow putty rolls for scar desensitization and edema management       -Dycem to open jars and scar massage    HEP and Patient Education:    -AROM forearm, wrist. digits 15-20 reps each, 2x/day  -edema management daily  -scar and STM daily   -Yellow putty rolls for scar desensitization and edema management   -Dycem to open jars and scar massage  -Educated patient to diagnosis and rehab expectations including frequency and duration of services.     Assessment:  Evaluation Data: Patient is a 54 yo RHDF  s/p R CTR and R TH TFR resulting in limited participation in pain-free ADLs and inability to perform at their prior level of function. Skilled Occupational Therapy services are warranted to address the impairments found & listed previously in the objective section in order to return to safe and pain-free ADLs and prior level of function and to to realize measurable change in the outcome measures and achieve improvements in patient’s functional status and individual goals.        Patient resides independently with spouse and adult son. Patient enjoys hobbies including walking and time with family.   Patient would like to gain recovery of their RIGHT hand function to improve their level of independence with ADLs and IADLs,   specifically opening containers and squeezing shower soaps.     PLAN OF CARE:   Plan      Follow Up with Referring Physician: as needed  Monitor home program.    Patient instructed to call if problems.      Frequency and duration: 1x/week for 8 visits .   Comprehensive reassessments will be completed intermittently.    Potential to achieve rehab goals is good . Patient would benefit from skilled Occupational Therapy services to address deficits and promote functional gains and return to timely completion of self care demands and IADL's.       GOALS:   Patient to demonstrate, with involved extremity    -good skill with progressive edema reduction technique facilitating gains with motion and function.   -good carry through with progressive soft tissue management techniques facilitating gains with motion and pain reduction.   -wrist AROM PERRY 120 degrees, to tolerate four point position during cleaning and shifting body position(s).  -finger AROM PERRY 220 degrees, to maintain grasp on ADL objects during use.  -thumb AROM opposition Kapandji 9/10, PERRY MCP and  degrees to use small and large digital screen devices without pain.  -9-Hole Peg at 17 seconds or self report of independence with clothing fasteners without pain.   -independence opening packages, key pinch 15#.  -independence opening jars,  strength 50#.  -good skill with progressive orthosis regimen, applying orthosis correctly and using according to medically necessary wear schedule as prescribed by therapist.    Patient verbalized understanding and is in agreement with Occupational Therapy Goals and the plan of care.     Problems To Be Addressed: Knowledge of precautions, knowledge of HEP, pain and edema management, ROM/joint mobility, coordination, motor skills, strength recovery, endurance recovery, orthosis use, wear/care, precautions.    Planned Interventions include: wound care as needed, patient education/instruction, home program instruction and review, edema control, manual therapy, motor coordination, therapeutic exercise, therapeutic activities, ADL and IADL retraining, neuromuscular re-education, dry needling, NMES, Fluidotherapy, ultrasound, Kinesiotaping, orthosis fabrication/fit training.

## 2024-04-17 ENCOUNTER — APPOINTMENT (OUTPATIENT)
Dept: OCCUPATIONAL THERAPY | Facility: CLINIC | Age: 54
End: 2024-04-17
Payer: COMMERCIAL

## 2024-04-24 ENCOUNTER — TREATMENT (OUTPATIENT)
Dept: OCCUPATIONAL THERAPY | Facility: CLINIC | Age: 54
End: 2024-04-24
Payer: COMMERCIAL

## 2024-04-24 DIAGNOSIS — G56.01 CARPAL TUNNEL SYNDROME, RIGHT UPPER LIMB: ICD-10-CM

## 2024-04-24 PROCEDURE — 97110 THERAPEUTIC EXERCISES: CPT | Mod: GO

## 2024-04-24 NOTE — PROGRESS NOTES
Occupational Therapy   Upper Extremity Progress Note    Patient Name: Kerri Comer  MRN: [id number]  Referring  Physician: Dr. Mario Quiroz  Time in: 1153   Time out: 1228  Total Time: 35 minutes  HC OT THERAPEUTIC EXERCISES  54788 (CPT®)Mods:GOQty:2  Current Problem  1. Carpal tunnel syndrome, right upper limb  Follow Up In Occupational Therapy          Date of Injury: 1/1/2008  Date of Surgery: 3/13/24 ( 6 weeks pot-op)    Precautions: HTN  Allergies:             four    Insurance    Visit number: 2/8   [employer]  60V PT OT ST   COPAY 0  DED 0 COVERAGE 100 OOP [phone])   NO AUTH REQ       Subjective    Patient states she can open loose tops. She reports follow through with home program.    Pain 3/30 constant     Objective  Hand Dominance: RIGHT    Outcome Measures:   DASH Score: 50.00    ADLS/IADLS: MOD IND    Skin/ Wound/Scar: Scar red,  flat, healing well. Tender as expected for stage of recovery.     Sensation: WFL    Dexterity/ Coordination: Monitor        AROM  Right Left   Wrist Extension/Flexion 75*/80* -    Radial Deviation/Ulnar Deviation 30*/40* -         Hand ROM  AROM   THUMB      Kapandji 8*/10    Palmar Abduction TBE    Radial Abduction TBE     Finger (ADPC)        Index Middle Ring Small    DPC* DPC* DPC* 2.0        Finger   MCP PIP DIP    Index - - -    Middle - - -    Ring - - -    Small - - -     Hand Strength  deferred   Gurmeet Dynamometer    Gross Grasp (lbs)  Right Left   2nd setting 21 45       Pinch Strength Right Left   Lateral 10 12   Tripod 7 11            Edema (cm):    Right Left   WRIST DWC 15.5 14.5           Treatment:   Re-evaluation of ROM and evaluated hand strength.  Discussed findings with patient. Discussed how to manage pillar pain. She received fluidotherapy and completed various hand position while completing sign language. Upgraded theraputty resistance to next on-red and patient completed hand strengthening exercises with good tolerance.    HEP and  Patient Education:  -Padding discussed for pillar pain- bike gloves , band aid  -Upgraded theraputty resistance for home program and patient completed exercises      Assessment:  Evaluation Data: Patient is a 54 yo RHDF  s/p R CTR and R TH TFR resulting in limited participation in pain-free ADLs and inability to perform at their prior level of function.   Patient made nice gains in ROM for wrist and digits. She is demonstrating decreased hand strength and goals updated.Patient is progressing nicely and will continue to benefit from skilled Occupational Therapy services.        PLAN OF CARE:   Plan      Follow Up with Referring Physician: as needed  Monitor home program.    Patient instructed to call if problems.      Frequency and duration: 1x/week for 8 visits .   Comprehensive reassessments will be completed intermittently.   Potential to achieve rehab goals is good . Patient would benefit from skilled Occupational Therapy services to address deficits and promote functional gains and return to timely completion of self care demands and IADL's.       GOALS:   Patient to demonstrate, with involved extremity    -good skill with progressive edema reduction technique facilitating gains with motion and function.   -good carry through with progressive soft tissue management techniques facilitating gains with motion and pain reduction.   -wrist AROM PERRY 120 degrees, to tolerate four point position during cleaning and shifting body position(s).  -finger AROM PERRY 220 degrees, to maintain grasp on ADL objects during use.  -thumb AROM opposition Kapandji 9/10, PERRY MCP and  degrees to use small and large digital screen devices without pain.  -9-Hole Peg at 17 seconds or self report of independence with clothing fasteners without pain.   -independence opening packages, key pinch 15#.  -independence opening jars,  strength 50#.  -good skill with progressive orthosis regimen, applying orthosis correctly and using  according to medically necessary wear schedule as prescribed by therapist.    Patient verbalized understanding and is in agreement with Occupational Therapy Goals and the plan of care.     Problems To Be Addressed: Knowledge of precautions, knowledge of HEP, pain and edema management, ROM/joint mobility, coordination, motor skills, strength recovery, endurance recovery, orthosis use, wear/care, precautions.    Planned Interventions include: wound care as needed, patient education/instruction, home program instruction and review, edema control, manual therapy, motor coordination, therapeutic exercise, therapeutic activities, ADL and IADL retraining, neuromuscular re-education, dry needling, NMES, Fluidotherapy, ultrasound, Kinesiotaping, orthosis fabrication/fit training.

## 2024-05-01 ENCOUNTER — APPOINTMENT (OUTPATIENT)
Dept: OCCUPATIONAL THERAPY | Facility: CLINIC | Age: 54
End: 2024-05-01
Payer: COMMERCIAL

## 2024-05-08 ENCOUNTER — TREATMENT (OUTPATIENT)
Dept: OCCUPATIONAL THERAPY | Facility: CLINIC | Age: 54
End: 2024-05-08
Payer: COMMERCIAL

## 2024-05-08 DIAGNOSIS — G56.01 CARPAL TUNNEL SYNDROME, RIGHT UPPER LIMB: ICD-10-CM

## 2024-05-08 PROCEDURE — 97110 THERAPEUTIC EXERCISES: CPT | Mod: GO | Performed by: OCCUPATIONAL THERAPIST

## 2024-05-08 PROCEDURE — 97140 MANUAL THERAPY 1/> REGIONS: CPT | Mod: GO | Performed by: OCCUPATIONAL THERAPIST

## 2024-05-08 ASSESSMENT — PAIN DESCRIPTION - DESCRIPTORS: DESCRIPTORS: SHARP;TIGHTNESS

## 2024-05-08 ASSESSMENT — PAIN - FUNCTIONAL ASSESSMENT: PAIN_FUNCTIONAL_ASSESSMENT: 0-10

## 2024-05-08 ASSESSMENT — PAIN SCALES - GENERAL: PAINLEVEL_OUTOF10: 7

## 2024-05-08 NOTE — PROGRESS NOTES
Occupational Therapy   Upper Extremity Progress Note    Patient Name: Kerri Comer  MRN: [id number]  Referring  Physician: Dr. Mario Quiroz  Time Calculation  Start Time: 1145  Stop Time: 1230  Time Calculation (min): 45 min      Current Problem  1. Carpal tunnel syndrome, right upper limb  Follow Up In Occupational Therapy          Date of Injury: 1/1/2008  Date of Surgery: 3/13/24 ( 6 weeks pot-op)    Precautions: HTN  Allergies:             four    Insurance    Visit number: 3/8   [employer]  60V PT OT ST   COPAY 0  DED 0 COVERAGE 100 OOP [phone]   NO AUTH REQ       Subjective    Patient states she can open loose tops. She reports follow through with home program.  Patient is able to drive her tractor easier than the zero turn. Patient unable to turn valves on .   Patient tried to use weed milton.     Pain Assessment  Pain Assessment: 0-10  Pain Score: 7  Pain Descriptors: Sharp, Tightness  Objective  Hand Dominance: RIGHT    Outcome Measures:   DASH Score: 50.00    ADLS/IADLS: MOD IND    Skin/ Wound/Scar: Scar pink,  flat, thick, hypersensitive both TFR andCTR.   Sensation: WFL    Dexterity/ Coordination: Monitor        AROM  Right Left   Wrist Extension/Flexion 65/70 -    Radial Deviation/Ulnar Deviation 27/38 -         Hand ROM  AROM   THUMB      Kapandji 8/10    Palmar Abduction TBE    Radial Abduction TBE     Finger (ADPC)        Index Middle Ring Small    0 0 0 0      Hand Strength  deferred   Gurmeet Dynamometer    Gross Grasp (lbs)  Right Left   2nd setting 21 45       Pinch Strength Right Left   Lateral 10 12   Tripod 7 11            Edema (cm):    Right Left   WRIST DWC 15.5 14.5       Treatment:   Modified Ulnar nerve glides  Measured motion  Chin Tuck  Dickerson Stretch  Scap squeeze  Anit-vibration gloves for home tasks  Mini massager and IASTM scars    HEP and Patient Education:  Posture  Putty  Ergonomics    Assessment: improving motion. Scar tender and tight. Strength  needs ongoing. Monitor ulnar nerve discomfort right forearm.     Evaluation Data: Patient is a 54 yo RHDF  s/p R CTR and R TH TFR resulting in limited participation in pain-free ADLs and inability to perform at their prior level of function.   Patient made nice gains in ROM for wrist and digits. She is demonstrating decreased hand strength and goals updated.Patient is progressing nicely and will continue to benefit from skilled Occupational Therapy services.        PLAN OF CARE:   Plan      Follow Up with Referring Physician: as needed  Monitor home program.    Patient instructed to call if problems.      Frequency and duration: 1x/week for 8 visits .   Comprehensive reassessments will be completed intermittently.   Potential to achieve rehab goals is good . Patient would benefit from skilled Occupational Therapy services to address deficits and promote functional gains and return to timely completion of self care demands and IADL's.       GOALS:   Patient to demonstrate, with involved extremity    -good skill with progressive edema reduction technique facilitating gains with motion and function.   -good carry through with progressive soft tissue management techniques facilitating gains with motion and pain reduction.   -wrist AROM PERRY 120 degrees, to tolerate four point position during cleaning and shifting body position(s).  -finger AROM PERRY 220 degrees, to maintain grasp on ADL objects during use.  -thumb AROM opposition Kapandji 9/10, PERRY MCP and  degrees to use small and large digital screen devices without pain.  -9-Hole Peg at 17 seconds or self report of independence with clothing fasteners without pain.   -independence opening packages, key pinch 15#.  -independence opening jars,  strength 50#.  -good skill with progressive orthosis regimen, applying orthosis correctly and using according to medically necessary wear schedule as prescribed by therapist.    Patient verbalized understanding and is in  agreement with Occupational Therapy Goals and the plan of care.     Problems To Be Addressed: Knowledge of precautions, knowledge of HEP, pain and edema management, ROM/joint mobility, coordination, motor skills, strength recovery, endurance recovery, orthosis use, wear/care, precautions.    Planned Interventions include: wound care as needed, patient education/instruction, home program instruction and review, edema control, manual therapy, motor coordination, therapeutic exercise, therapeutic activities, ADL and IADL retraining, neuromuscular re-education, dry needling, NMES, Fluidotherapy, ultrasound, Kinesiotaping, orthosis fabrication/fit training.

## 2024-05-14 ENCOUNTER — APPOINTMENT (OUTPATIENT)
Dept: OCCUPATIONAL THERAPY | Facility: CLINIC | Age: 54
End: 2024-05-14
Payer: COMMERCIAL

## 2024-09-06 DIAGNOSIS — I10 PRIMARY HYPERTENSION: ICD-10-CM

## 2024-09-06 DIAGNOSIS — E78.5 HYPERLIPIDEMIA, UNSPECIFIED HYPERLIPIDEMIA TYPE: ICD-10-CM

## 2024-09-06 DIAGNOSIS — E55.9 VITAMIN D DEFICIENCY: ICD-10-CM

## 2024-09-06 DIAGNOSIS — Z00.00 ANNUAL PHYSICAL EXAM: ICD-10-CM

## 2024-09-16 ENCOUNTER — LAB (OUTPATIENT)
Dept: LAB | Facility: LAB | Age: 54
End: 2024-09-16
Payer: COMMERCIAL

## 2024-09-16 ENCOUNTER — APPOINTMENT (OUTPATIENT)
Dept: PRIMARY CARE | Facility: CLINIC | Age: 54
End: 2024-09-16
Payer: COMMERCIAL

## 2024-09-16 VITALS
OXYGEN SATURATION: 95 % | SYSTOLIC BLOOD PRESSURE: 150 MMHG | TEMPERATURE: 97.7 F | HEART RATE: 76 BPM | BODY MASS INDEX: 25.16 KG/M2 | HEIGHT: 65 IN | RESPIRATION RATE: 16 BRPM | WEIGHT: 151 LBS | DIASTOLIC BLOOD PRESSURE: 90 MMHG

## 2024-09-16 DIAGNOSIS — F17.210 CIGARETTE SMOKER: ICD-10-CM

## 2024-09-16 DIAGNOSIS — Z00.00 ANNUAL PHYSICAL EXAM: ICD-10-CM

## 2024-09-16 DIAGNOSIS — E55.9 VITAMIN D DEFICIENCY: ICD-10-CM

## 2024-09-16 DIAGNOSIS — Z12.31 ENCOUNTER FOR SCREENING MAMMOGRAM FOR MALIGNANT NEOPLASM OF BREAST: ICD-10-CM

## 2024-09-16 DIAGNOSIS — R31.0 GROSS HEMATURIA: Primary | ICD-10-CM

## 2024-09-16 DIAGNOSIS — I10 PRIMARY HYPERTENSION: ICD-10-CM

## 2024-09-16 DIAGNOSIS — E78.5 HYPERLIPIDEMIA, UNSPECIFIED HYPERLIPIDEMIA TYPE: ICD-10-CM

## 2024-09-16 LAB
25(OH)D3 SERPL-MCNC: 42 NG/ML (ref 30–100)
ALBUMIN SERPL BCP-MCNC: 4.3 G/DL (ref 3.4–5)
ALP SERPL-CCNC: 94 U/L (ref 33–110)
ALT SERPL W P-5'-P-CCNC: 13 U/L (ref 7–45)
ANION GAP SERPL CALC-SCNC: 9 MMOL/L (ref 10–20)
AST SERPL W P-5'-P-CCNC: 16 U/L (ref 9–39)
BASOPHILS # BLD AUTO: 0.05 X10*3/UL (ref 0–0.1)
BASOPHILS NFR BLD AUTO: 0.8 %
BILIRUB SERPL-MCNC: 0.3 MG/DL (ref 0–1.2)
BUN SERPL-MCNC: 11 MG/DL (ref 6–23)
CALCIUM SERPL-MCNC: 9 MG/DL (ref 8.6–10.3)
CHLORIDE SERPL-SCNC: 105 MMOL/L (ref 98–107)
CHOLEST SERPL-MCNC: 197 MG/DL (ref 0–199)
CHOLESTEROL/HDL RATIO: 3.4
CO2 SERPL-SCNC: 30 MMOL/L (ref 21–32)
CREAT SERPL-MCNC: 0.75 MG/DL (ref 0.5–1.05)
EGFRCR SERPLBLD CKD-EPI 2021: >90 ML/MIN/1.73M*2
EOSINOPHIL # BLD AUTO: 0.15 X10*3/UL (ref 0–0.7)
EOSINOPHIL NFR BLD AUTO: 2.3 %
ERYTHROCYTE [DISTWIDTH] IN BLOOD BY AUTOMATED COUNT: 12.3 % (ref 11.5–14.5)
GLUCOSE SERPL-MCNC: 90 MG/DL (ref 74–99)
HCT VFR BLD AUTO: 43.8 % (ref 36–46)
HDLC SERPL-MCNC: 57.7 MG/DL
HGB BLD-MCNC: 14.3 G/DL (ref 12–16)
IMM GRANULOCYTES # BLD AUTO: 0.01 X10*3/UL (ref 0–0.7)
IMM GRANULOCYTES NFR BLD AUTO: 0.2 % (ref 0–0.9)
LDLC SERPL CALC-MCNC: 119 MG/DL
LYMPHOCYTES # BLD AUTO: 2.46 X10*3/UL (ref 1.2–4.8)
LYMPHOCYTES NFR BLD AUTO: 37.6 %
MAGNESIUM SERPL-MCNC: 2.1 MG/DL (ref 1.6–2.4)
MCH RBC QN AUTO: 32.7 PG (ref 26–34)
MCHC RBC AUTO-ENTMCNC: 32.6 G/DL (ref 32–36)
MCV RBC AUTO: 100 FL (ref 80–100)
MONOCYTES # BLD AUTO: 0.44 X10*3/UL (ref 0.1–1)
MONOCYTES NFR BLD AUTO: 6.7 %
NEUTROPHILS # BLD AUTO: 3.44 X10*3/UL (ref 1.2–7.7)
NEUTROPHILS NFR BLD AUTO: 52.4 %
NON HDL CHOLESTEROL: 139 MG/DL (ref 0–149)
NRBC BLD-RTO: 0 /100 WBCS (ref 0–0)
PLATELET # BLD AUTO: 217 X10*3/UL (ref 150–450)
POC APPEARANCE, URINE: CLEAR
POC BILIRUBIN, URINE: NEGATIVE
POC BLOOD, URINE: ABNORMAL
POC COLOR, URINE: YELLOW
POC GLUCOSE, URINE: NEGATIVE MG/DL
POC KETONES, URINE: NEGATIVE MG/DL
POC LEUKOCYTES, URINE: NEGATIVE
POC NITRITE,URINE: NEGATIVE
POC PH, URINE: 6 PH
POC PROTEIN, URINE: NEGATIVE MG/DL
POC SPECIFIC GRAVITY, URINE: >=1.03
POC UROBILINOGEN, URINE: 0.2 EU/DL
POTASSIUM SERPL-SCNC: 3.8 MMOL/L (ref 3.5–5.3)
PROT SERPL-MCNC: 6.7 G/DL (ref 6.4–8.2)
RBC # BLD AUTO: 4.37 X10*6/UL (ref 4–5.2)
SODIUM SERPL-SCNC: 140 MMOL/L (ref 136–145)
TRIGL SERPL-MCNC: 103 MG/DL (ref 0–149)
TSH SERPL-ACNC: 1.06 MIU/L (ref 0.44–3.98)
VLDL: 21 MG/DL (ref 0–40)
WBC # BLD AUTO: 6.6 X10*3/UL (ref 4.4–11.3)

## 2024-09-16 PROCEDURE — 82306 VITAMIN D 25 HYDROXY: CPT

## 2024-09-16 PROCEDURE — 85025 COMPLETE CBC W/AUTO DIFF WBC: CPT

## 2024-09-16 PROCEDURE — 80053 COMPREHEN METABOLIC PANEL: CPT

## 2024-09-16 PROCEDURE — 36415 COLL VENOUS BLD VENIPUNCTURE: CPT

## 2024-09-16 PROCEDURE — 3080F DIAST BP >= 90 MM HG: CPT | Performed by: FAMILY MEDICINE

## 2024-09-16 PROCEDURE — 81003 URINALYSIS AUTO W/O SCOPE: CPT | Performed by: FAMILY MEDICINE

## 2024-09-16 PROCEDURE — 87389 HIV-1 AG W/HIV-1&-2 AB AG IA: CPT

## 2024-09-16 PROCEDURE — 87086 URINE CULTURE/COLONY COUNT: CPT

## 2024-09-16 PROCEDURE — 86803 HEPATITIS C AB TEST: CPT

## 2024-09-16 PROCEDURE — 3077F SYST BP >= 140 MM HG: CPT | Performed by: FAMILY MEDICINE

## 2024-09-16 PROCEDURE — 80061 LIPID PANEL: CPT

## 2024-09-16 PROCEDURE — 83735 ASSAY OF MAGNESIUM: CPT

## 2024-09-16 PROCEDURE — 3008F BODY MASS INDEX DOCD: CPT | Performed by: FAMILY MEDICINE

## 2024-09-16 PROCEDURE — 99213 OFFICE O/P EST LOW 20 MIN: CPT | Performed by: FAMILY MEDICINE

## 2024-09-16 PROCEDURE — 84443 ASSAY THYROID STIM HORMONE: CPT

## 2024-09-16 ASSESSMENT — ENCOUNTER SYMPTOMS
NAUSEA: 0
FREQUENCY: 0
VOMITING: 0
HEMATURIA: 1
FLANK PAIN: 0
FEVER: 0
WEIGHT LOSS: 0
DYSURIA: 0

## 2024-09-16 ASSESSMENT — LIFESTYLE VARIABLES: TOBACCO_USE: 1

## 2024-09-16 NOTE — PROGRESS NOTES
"Subjective   Patient ID: Kerri Comer is a 54 y.o. female who presents for Blood in Urine (X couple months. She mostly notice it when she lift heavy things  ).  137/89 blood pressure at home when checking it.    Blood in Urine  This is a chronic problem. The current episode started more than 1 month ago (approx 3 mos). The problem has been waxing and waning since onset. She describes the hematuria as gross hematuria. She reports no clotting in her urine stream. She is experiencing no pain. She describes her urine color as dark red. Irritative symptoms do not include frequency, nocturia or urgency. Obstructive symptoms do not include dribbling, incomplete emptying, an intermittent stream, a slower stream, straining or a weak stream. Associated symptoms include bladder pain. Pertinent negatives include no dysuria, fever, flank pain, genital pain, hesitancy, inability to urinate, nausea, urinary retention, vomiting or weight loss. She is sexually active. Her past medical history is significant for tobacco use. There is no history of  trauma, hypertension or kidney stones.       Review of Systems   Constitutional:  Negative for fever and weight loss.   Gastrointestinal:  Negative for nausea and vomiting.   Genitourinary:  Positive for hematuria. Negative for dysuria, flank pain, frequency, hesitancy, incomplete emptying, nocturia and urgency.       Objective   /90   Pulse 76   Temp 36.5 °C (97.7 °F) (Temporal)   Resp 16   Ht 1.651 m (5' 5\")   Wt 68.5 kg (151 lb)   SpO2 95%   BMI 25.13 kg/m²   Physical Exam  Constitutional:       Appearance: Normal appearance.   HENT:      Head: Normocephalic and atraumatic.      Right Ear: External ear normal.      Left Ear: External ear normal.      Nose: Nose normal.   Eyes:      General: No scleral icterus.  Cardiovascular:      Rate and Rhythm: Normal rate and regular rhythm.   Pulmonary:      Effort: Pulmonary effort is normal.      Breath sounds: Normal breath " sounds.   Abdominal:      Palpations: Abdomen is soft.      Tenderness: There is no abdominal tenderness.   Skin:     General: Skin is warm and dry.   Neurological:      General: No focal deficit present.      Mental Status: She is alert and oriented to person, place, and time.   Psychiatric:         Mood and Affect: Mood normal.         Behavior: Behavior normal.       Assessment/Plan   Problem List Items Addressed This Visit    None  Visit Diagnoses       Gross hematuria    -  Primary    Relevant Orders    POCT UA Automated manually resulted (Completed)    Urine Culture    CT abdomen pelvis wo IV contrast    Referral to Urology    Follow Up In [facility] - PCP - Health Maintenance    Encounter for screening mammogram for malignant neoplasm of breast        Relevant Orders    POCT UA Automated manually resulted (Completed)    Urine Culture    BI mammo bilateral screening tomosynthesis    Follow Up In [facility] - PCP - Health Maintenance    Cigarette smoker        Relevant Orders    POCT UA Automated manually resulted (Completed)    Urine Culture    CT lung screening low dose    Follow Up In [facility] - PCP - Health Maintenance

## 2024-09-17 LAB
HCV AB SER QL: NONREACTIVE
HIV 1+2 AB+HIV1 P24 AG SERPL QL IA: NONREACTIVE

## 2024-09-18 LAB — BACTERIA UR CULT: NO GROWTH

## 2024-09-24 ENCOUNTER — APPOINTMENT (OUTPATIENT)
Dept: PRIMARY CARE | Facility: CLINIC | Age: 54
End: 2024-09-24
Payer: COMMERCIAL

## 2024-09-24 VITALS
TEMPERATURE: 97.2 F | SYSTOLIC BLOOD PRESSURE: 135 MMHG | HEIGHT: 65 IN | HEART RATE: 73 BPM | DIASTOLIC BLOOD PRESSURE: 85 MMHG | RESPIRATION RATE: 16 BRPM | WEIGHT: 151 LBS | BODY MASS INDEX: 25.16 KG/M2 | OXYGEN SATURATION: 95 %

## 2024-09-24 DIAGNOSIS — R03.0 WHITE COAT SYNDROME WITHOUT DIAGNOSIS OF HYPERTENSION: ICD-10-CM

## 2024-09-24 DIAGNOSIS — J44.1 COPD EXACERBATION (MULTI): ICD-10-CM

## 2024-09-24 DIAGNOSIS — E55.9 VITAMIN D DEFICIENCY: ICD-10-CM

## 2024-09-24 DIAGNOSIS — Z00.00 ROUTINE GENERAL MEDICAL EXAMINATION AT A HEALTH CARE FACILITY: Primary | ICD-10-CM

## 2024-09-24 DIAGNOSIS — E78.2 MIXED HYPERLIPIDEMIA: ICD-10-CM

## 2024-09-24 DIAGNOSIS — F17.210 CIGARETTE SMOKER: ICD-10-CM

## 2024-09-24 DIAGNOSIS — Z12.31 ENCOUNTER FOR SCREENING MAMMOGRAM FOR MALIGNANT NEOPLASM OF BREAST: ICD-10-CM

## 2024-09-24 PROBLEM — E78.5 HYPERLIPIDEMIA: Status: RESOLVED | Noted: 2023-05-12 | Resolved: 2024-09-24

## 2024-09-24 PROCEDURE — 3008F BODY MASS INDEX DOCD: CPT | Performed by: FAMILY MEDICINE

## 2024-09-24 PROCEDURE — 4004F PT TOBACCO SCREEN RCVD TLK: CPT | Performed by: FAMILY MEDICINE

## 2024-09-24 PROCEDURE — 3079F DIAST BP 80-89 MM HG: CPT | Performed by: FAMILY MEDICINE

## 2024-09-24 PROCEDURE — 3075F SYST BP GE 130 - 139MM HG: CPT | Performed by: FAMILY MEDICINE

## 2024-09-24 PROCEDURE — 99396 PREV VISIT EST AGE 40-64: CPT | Performed by: FAMILY MEDICINE

## 2024-09-24 RX ORDER — ACETAMINOPHEN 500 MG
2000 TABLET ORAL DAILY
Qty: 90 CAPSULE | Refills: 3 | Status: SHIPPED | OUTPATIENT
Start: 2024-09-24 | End: 2024-09-24

## 2024-09-24 RX ORDER — ACETAMINOPHEN 500 MG
2000 TABLET ORAL DAILY
Qty: 90 CAPSULE | Refills: 3 | Status: SHIPPED | OUTPATIENT
Start: 2024-09-24 | End: 2025-09-24

## 2024-09-24 RX ORDER — PREDNISONE 10 MG/1
30 TABLET ORAL DAILY
Qty: 30 TABLET | Refills: 0 | Status: SHIPPED | OUTPATIENT
Start: 2024-09-24 | End: 2024-10-04

## 2024-09-24 RX ORDER — DOXYCYCLINE 100 MG/1
100 CAPSULE ORAL 2 TIMES DAILY
Qty: 28 CAPSULE | Refills: 0 | Status: SHIPPED | OUTPATIENT
Start: 2024-09-24 | End: 2024-10-08

## 2024-09-24 ASSESSMENT — ENCOUNTER SYMPTOMS
CHILLS: 0
UNEXPECTED WEIGHT CHANGE: 0
DYSPHORIC MOOD: 0
ABDOMINAL DISTENTION: 0
WEAKNESS: 0
VOMITING: 0
DIAPHORESIS: 0
NERVOUS/ANXIOUS: 0
SHORTNESS OF BREATH: 0
POLYDIPSIA: 0
RHINORRHEA: 0
ARTHRALGIAS: 0
FLANK PAIN: 0
BACK PAIN: 0
DIARRHEA: 0
JOINT SWELLING: 0
NAUSEA: 0
PALPITATIONS: 0
COUGH: 1
ACTIVITY CHANGE: 0
CHEST TIGHTNESS: 0
FATIGUE: 0
DYSURIA: 0
SPEECH DIFFICULTY: 0
NECK PAIN: 0
HEMATURIA: 0
TROUBLE SWALLOWING: 0
NECK STIFFNESS: 0
ADENOPATHY: 0
CONFUSION: 0
SLEEP DISTURBANCE: 0
AGITATION: 0
TREMORS: 0
CONSTIPATION: 0
VOICE CHANGE: 0
BRUISES/BLEEDS EASILY: 0
BLOOD IN STOOL: 0
LIGHT-HEADEDNESS: 0
WOUND: 0
SINUS PRESSURE: 0
SEIZURES: 0
ABDOMINAL PAIN: 0
FREQUENCY: 0
CHOKING: 0
MYALGIAS: 0
EYE REDNESS: 0
PHOTOPHOBIA: 0
FEVER: 0
EYE ITCHING: 0
HALLUCINATIONS: 0
NUMBNESS: 0
EYE DISCHARGE: 0
WHEEZING: 1
APPETITE CHANGE: 0
SORE THROAT: 0
DIFFICULTY BREATHING: 1
HEADACHES: 0
EYE PAIN: 0
FACIAL ASYMMETRY: 0
DIZZINESS: 0

## 2024-09-24 NOTE — PROGRESS NOTES
Subjective   Patient ID: Kerri Comer is a 54 y.o. female who presents for Annual Exam (And review labs), Follow-up (Hematuria), and Cough (X 3 weeks/Still having right flank pain ).  Patient comes to the office today for follow-up on gross hematuria.  She had labs drawn.  Gross hematuria has not reoccurred.  She has been maintaining good fluid intake.  She has been having some recent increased cough and wheezing especially at night cough caused a rib pole in the right inferior costal margin.  She is still smoking about a half a pack of cigarettes daily.    Subjective  Kerri Comer is a 54 y.o. female and is here for a comprehensive physical exam. The patient reports follow-up on hematuria.  Is having increased coughing.  Right lower rib pain with cough.    Do you take any herbs or supplements that were not prescribed by a doctor? no  Are you taking calcium supplements? no  Are you taking aspirin daily? no      History:  LMP: No LMP recorded. Patient is perimenopausal.           Breathing Problem  She complains of cough, difficulty breathing and wheezing. There is no shortness of breath. This is a recurrent problem. The current episode started in the past 7 days. The problem occurs constantly. The problem has been gradually worsening. The cough is productive of sputum. Pertinent negatives include no appetite change, chest pain, ear pain, fever, headaches, myalgias, postnasal drip, rhinorrhea, sneezing, sore throat or trouble swallowing. Her symptoms are aggravated by exposure to smoke and occupational exposure. Her symptoms are alleviated by nothing. She reports complete improvement on treatment. Risk factors for lung disease include smoking/tobacco exposure. Her past medical history is significant for bronchitis and pneumonia.       Review of Systems   Constitutional:  Negative for activity change, appetite change, chills, diaphoresis, fatigue, fever and unexpected weight change.   HENT:  Negative for  "congestion, ear pain, hearing loss, nosebleeds, postnasal drip, rhinorrhea, sinus pressure, sneezing, sore throat, tinnitus, trouble swallowing and voice change.    Eyes:  Negative for photophobia, pain, discharge, redness, itching and visual disturbance.   Respiratory:  Positive for cough and wheezing. Negative for choking, chest tightness and shortness of breath.    Cardiovascular:  Negative for chest pain, palpitations and leg swelling.   Gastrointestinal:  Negative for abdominal distention, abdominal pain, blood in stool, constipation, diarrhea, nausea and vomiting.   Endocrine: Negative for cold intolerance, heat intolerance, polydipsia and polyuria.   Genitourinary:  Negative for dysuria, flank pain, frequency, hematuria and urgency.   Musculoskeletal:  Negative for arthralgias, back pain, joint swelling, myalgias, neck pain and neck stiffness.   Skin:  Negative for rash and wound.   Allergic/Immunologic: Negative for immunocompromised state.   Neurological:  Negative for dizziness, tremors, seizures, syncope, facial asymmetry, speech difficulty, weakness, light-headedness, numbness and headaches.   Hematological:  Negative for adenopathy. Does not bruise/bleed easily.   Psychiatric/Behavioral:  Negative for agitation, behavioral problems, confusion, dysphoric mood, hallucinations, self-injury, sleep disturbance and suicidal ideas. The patient is not nervous/anxious.        Objective   /85   Pulse 73   Temp 36.2 °C (97.2 °F) (Temporal)   Resp 16   Ht 1.638 m (5' 4.5\")   Wt 68.5 kg (151 lb)   SpO2 95%   BMI 25.52 kg/m²   Physical Exam  Constitutional:       General: She is not in acute distress.     Appearance: Normal appearance. She is not ill-appearing or diaphoretic.   HENT:      Head: Normocephalic and atraumatic.      Right Ear: External ear normal.      Left Ear: External ear normal.      Nose: Nose normal. No rhinorrhea.   Eyes:      General: Lids are normal. No scleral icterus.        Right " eye: No discharge.         Left eye: No discharge.      Conjunctiva/sclera: Conjunctivae normal.   Cardiovascular:      Rate and Rhythm: Regular rhythm.      Pulses: Normal pulses.      Heart sounds: No murmur heard.  Pulmonary:      Effort: Pulmonary effort is normal. No respiratory distress.      Breath sounds: Normal breath sounds. No decreased breath sounds, wheezing, rhonchi or rales.   Abdominal:      General: Bowel sounds are normal. There is no distension.      Palpations: Abdomen is soft. There is no mass.      Tenderness: There is no abdominal tenderness. There is no guarding or rebound.   Musculoskeletal:         General: No swelling, tenderness or deformity.      Cervical back: No rigidity or tenderness.      Right lower leg: No edema.      Left lower leg: No edema.   Lymphadenopathy:      Cervical: No cervical adenopathy.      Upper Body:      Right upper body: No supraclavicular adenopathy.      Left upper body: No supraclavicular adenopathy.   Skin:     General: Skin is warm and dry.      Coloration: Skin is not jaundiced or pale.      Findings: No erythema, lesion or rash.   Neurological:      General: No focal deficit present.      Mental Status: She is alert and oriented to person, place, and time.      Sensory: No sensory deficit.      Motor: No weakness or tremor.      Coordination: Coordination normal.      Gait: Gait normal.   Psychiatric:         Mood and Affect: Mood normal. Affect is not inappropriate.         Behavior: Behavior normal.       Assessment/Plan   Problem List Items Addressed This Visit       Vitamin D deficiency    Relevant Medications    cholecalciferol (Vitamin D3) 50 mcg (2,000 unit) capsule    Other Relevant Orders    Follow Up In [facility] - PCP - Health Maintenance    White coat syndrome without diagnosis of hypertension    Relevant Orders    CBC and Auto Differential    Comprehensive Metabolic Panel    Lipid Panel    Magnesium    TSH with reflex to Free T4 if  abnormal    Follow Up In [facility] - PCP - Health Maintenance    Mixed hyperlipidemia    Relevant Orders    CBC and Auto Differential    Comprehensive Metabolic Panel    Lipid Panel    Magnesium    TSH with reflex to Free T4 if abnormal    Follow Up In [facility] - PCP - Health Maintenance     Other Visit Diagnoses       Routine general medical examination at a health care facility    -  Primary    Relevant Orders    Follow Up In [facility] - PCP - Health Maintenance    Encounter for screening mammogram for malignant neoplasm of breast        Cigarette smoker        Relevant Orders    Follow Up In [facility] - PCP - Health Maintenance    COPD exacerbation (Multi)        Relevant Medications    doxycycline (Vibramycin) 100 mg capsule    predniSONE (Deltasone) 10 mg tablet    Other Relevant Orders    Follow Up In [facility] - PCP - Health Maintenance         2. Patient Counseling:  --Nutrition: Stressed importance of moderation in sodium/caffeine intake, saturated fat and cholesterol, caloric balance, sufficient intake of fresh fruits, vegetables, fiber, calcium, iron.  --Exercise: Stressed the importance of regular exercise.   --Substance Abuse: Discussed cessation/primary prevention of tobacco, alcohol, or other drug use; driving or other dangerous activities under the influence; availability of treatment for abuse.   --Injury prevention: Discussed safety belts, safety helmets, smoke detector, smoking near bedding or upholstery.   --Dental health: Discussed importance of regular tooth brushing, flossing, and dental visits.  --Immunizations reviewed.  --Discussed benefits of screening colonoscopy.  Due 6/2025  3. Discussed the patient's BMI with her.  The BMI is above average. The patient received Current weight: 68.5 kg (151 lb)  Weight change since last visit (-) denotes wt loss 0 lbs   Weight loss needed to achieve BMI 25: 3.4 Lbs  Weight loss needed to  achieve BMI 30: -26.1 Lbs    Provided instructions on dietary changes  Provided instructions on exercise  Advised to Increase physical activity because they have an above normal BMI.  4. Follow up in one year  137/89 blood pressure at home when checking it.

## 2024-09-30 ENCOUNTER — ANCILLARY PROCEDURE (OUTPATIENT)
Facility: HOSPITAL | Age: 54
End: 2024-09-30
Payer: COMMERCIAL

## 2024-09-30 ENCOUNTER — HOSPITAL ENCOUNTER (OUTPATIENT)
Dept: RADIOLOGY | Facility: HOSPITAL | Age: 54
Discharge: HOME | End: 2024-09-30
Payer: COMMERCIAL

## 2024-09-30 VITALS — WEIGHT: 150 LBS | BODY MASS INDEX: 25.61 KG/M2 | HEIGHT: 64 IN

## 2024-09-30 DIAGNOSIS — R31.0 GROSS HEMATURIA: ICD-10-CM

## 2024-09-30 DIAGNOSIS — Z12.31 ENCOUNTER FOR SCREENING MAMMOGRAM FOR MALIGNANT NEOPLASM OF BREAST: ICD-10-CM

## 2024-09-30 PROCEDURE — 77067 SCR MAMMO BI INCL CAD: CPT | Performed by: RADIOLOGY

## 2024-09-30 PROCEDURE — 77067 SCR MAMMO BI INCL CAD: CPT

## 2024-09-30 PROCEDURE — 77063 BREAST TOMOSYNTHESIS BI: CPT | Performed by: RADIOLOGY

## 2024-09-30 PROCEDURE — 74176 CT ABD & PELVIS W/O CONTRAST: CPT

## 2024-09-30 PROCEDURE — 74176 CT ABD & PELVIS W/O CONTRAST: CPT | Performed by: RADIOLOGY

## 2024-10-23 DIAGNOSIS — J44.9 CHRONIC OBSTRUCTIVE PULMONARY DISEASE, UNSPECIFIED COPD TYPE (MULTI): Primary | ICD-10-CM

## 2024-10-23 RX ORDER — DOXYCYCLINE 100 MG/1
100 CAPSULE ORAL 2 TIMES DAILY
Qty: 20 CAPSULE | Refills: 0 | Status: SHIPPED | OUTPATIENT
Start: 2024-10-23 | End: 2024-11-02

## 2024-10-23 RX ORDER — PREDNISONE 10 MG/1
10 TABLET ORAL DAILY
Qty: 21 TABLET | Refills: 17 | Status: SHIPPED | OUTPATIENT
Start: 2024-10-23 | End: 2025-10-23

## 2024-11-18 ENCOUNTER — HOSPITAL ENCOUNTER (OUTPATIENT)
Dept: RADIOLOGY | Facility: HOSPITAL | Age: 54
Discharge: HOME | End: 2024-11-18
Payer: COMMERCIAL

## 2024-11-18 DIAGNOSIS — F17.210 CIGARETTE SMOKER: ICD-10-CM

## 2024-11-18 PROCEDURE — 71271 CT THORAX LUNG CANCER SCR C-: CPT | Performed by: RADIOLOGY

## 2024-11-18 PROCEDURE — 71271 CT THORAX LUNG CANCER SCR C-: CPT

## 2024-11-19 DIAGNOSIS — R93.1 ELEVATED CORONARY ARTERY CALCIUM SCORE: ICD-10-CM

## 2024-11-21 ENCOUNTER — OFFICE VISIT (OUTPATIENT)
Dept: CARDIOLOGY | Facility: CLINIC | Age: 54
End: 2024-11-21
Payer: COMMERCIAL

## 2024-11-21 VITALS
DIASTOLIC BLOOD PRESSURE: 78 MMHG | WEIGHT: 153 LBS | HEART RATE: 70 BPM | HEIGHT: 64 IN | SYSTOLIC BLOOD PRESSURE: 142 MMHG | BODY MASS INDEX: 26.12 KG/M2

## 2024-11-21 DIAGNOSIS — E55.9 VITAMIN D DEFICIENCY: ICD-10-CM

## 2024-11-21 DIAGNOSIS — E78.2 MIXED HYPERLIPIDEMIA: Primary | ICD-10-CM

## 2024-11-21 DIAGNOSIS — R93.1 ELEVATED CORONARY ARTERY CALCIUM SCORE: ICD-10-CM

## 2024-11-21 DIAGNOSIS — I34.0 MITRAL VALVE INSUFFICIENCY, UNSPECIFIED ETIOLOGY: ICD-10-CM

## 2024-11-21 DIAGNOSIS — J44.9 CHRONIC OBSTRUCTIVE PULMONARY DISEASE, UNSPECIFIED COPD TYPE (MULTI): ICD-10-CM

## 2024-11-21 DIAGNOSIS — R03.0 WHITE COAT SYNDROME WITHOUT DIAGNOSIS OF HYPERTENSION: ICD-10-CM

## 2024-11-21 PROCEDURE — 3008F BODY MASS INDEX DOCD: CPT | Performed by: NURSE PRACTITIONER

## 2024-11-21 PROCEDURE — 3078F DIAST BP <80 MM HG: CPT | Performed by: NURSE PRACTITIONER

## 2024-11-21 PROCEDURE — 3077F SYST BP >= 140 MM HG: CPT | Performed by: NURSE PRACTITIONER

## 2024-11-21 PROCEDURE — 99205 OFFICE O/P NEW HI 60 MIN: CPT | Performed by: NURSE PRACTITIONER

## 2024-11-21 PROCEDURE — 93000 ELECTROCARDIOGRAM COMPLETE: CPT | Performed by: NURSE PRACTITIONER

## 2024-11-21 NOTE — PROGRESS NOTES
Kerri Comer is a 54 y.o. female that presents to the office today for new patient cardiac evaluation referred by Dr. Price for elevated ca score.  She has a PMH of hyperlipidemia, COPD, Vit D deficiency, positive D-dimer, syncope, hematuria.  She is a daily tobacco smoker approximately half a pack per day.  Denies vaping or recreational drug use.  Admits to social alcohol use.  Occasional caffeine consumption coffee.  Does not exercise on a daily basis but is very physically active with her daily routine.  No known  family history of coronary artery disease.      She recently underwent routine lung scan which showed coronary calcium score 5.  Overall she states she feels well and denies any cardiac symptoms.  She denies any chest pain, chest pressure, chest tightness, lightheadedness or dizziness.  Denies palpitations or lower extremity edema.  Admits to some occasional shortness of breath which she contributes to her smoking/COPD.    Testing Reviewed  EKG obtained in the office today and verified with Dr. Ellsworth  shows NSR, possible left atrial enlargement HR 67  bpm, QT/Qtc 386/407    11/19/2024 CT lung screening/CA score.  HEART AND VESSELS:  The thoracic aorta normal in course and caliber.  Main pulmonary artery and its branches are normal in caliber.  Estimated coronary artery calcium score is 5.  The cardiac chambers are not enlarged.    IMPRESSION:  1.  Few small bilateral noncalcified pulmonary nodules measuring up  to8 mm, likely benign. There change when compared to a study of  07/11/2014 Continued screening with low-dose noncontrast chest CT in  12 months (from current date) is recommended.  2. Estimated coronary artery calcium score is 5* which correlates  with at least 79th percentile rank as compared to matched POSEY-study  subjects(https://www.posey-nhlbi.org/Calcium/input.aspx).  There is no pericardial effusion seen.    9/16/2024 labs: , K+ 3.8, BUN 11, creatinine 0.75, ALT 13, AST  "cholesterol 197, HDL 57, , triglycerides 103, TSH 1.06, vitamin D 42, WBC 6.6, Hgb 14, platelets 217.    10/20/2021 echocardiogram;  Exam indication: Syncope, SOB and Palpitations   - The left ventricle is normal in size. Left ventricular systolic function is   normal. EF = 58 ± 5% (2D biplane)   - The right ventricle is normal in size. Right ventricular systolic function is   normal.   - Estimated right ventricular systolic pressure is likely underestimated due to a   weak or incomplete tricuspid regurgitation signal and is, at least, 27 mmHg   consistent with normal pulmonary artery pressures. Estimated right atrial pressure    is 3 mmHg based on IVC assessment.     MITRAL VALVE   There is mild (1+) mitral valve regurgitation due to prolapse. There is mild   thickening. Regurgitant orifice area (PISA) is 0.08 cm². The pressure half time is    26 msec. The peak mitral E/A ratio is 0.91. The average mitral E/e' ratio is 6.1.    The mitral flow deceleration time is 91 msec.     10/20/2021 Nuclear perfusion stress test  1. SPECT Perfusion Study: Normal.    2. There is no scintigraphic evidence for inducible ischemia.    3. No evidence of scarred myocardium.    4. Left ventricle is normal in size. The left ventricle systolic   function is normal.    5. Right ventricle is normal in size. The right ventricle systolic   function is normal.    6. This is a low risk scan.     Assessment/Plan  Abnormal coronary calcium score; \"5\".  With normal lipid panel other than slightly elevated LDL.  She is reluctant to start statin therapy.  Recommend dietary changes along with increased activity.  Mitral regurgitation; noted on echocardiogram 2021.  Obtain echocardiogram to assess for valve and pulm function/progression of MR  Tobacco abuse; she is not interested in stopping at this time  Hematuria; following with urology  Follow-up in the office after echocardiogram for results and further recommendations      Patient Active " Problem List   Diagnosis    Vitamin D deficiency    Tobacco use disorder    Positive D dimer    White coat syndrome without diagnosis of hypertension    Mixed hyperlipidemia    Chronic obstructive lung disease (Multi)    Abnormal mammogram    Allergic rhinitis    Nicotine use disorder    Menopausal symptom    Perimenopausal symptom    Carpal tunnel syndrome, right upper limb    Trigger finger of right thumb    Elevated coronary artery calcium score       Social History     Tobacco Use    Smoking status: Every Day     Current packs/day: 1.00     Average packs/day: 1 pack/day for 30.0 years (30.0 ttl pk-yrs)     Types: Cigarettes    Smokeless tobacco: Never   Vaping Use    Vaping status: Never Used   Substance Use Topics    Alcohol use: Yes     Alcohol/week: 5.0 standard drinks of alcohol     Types: 5 Standard drinks or equivalent per week    Drug use: Not Currently       Past Medical History:   Diagnosis Date    Allergic     Angina pectoris, unspecified 09/24/2021    Typical angina    Elevated blood-pressure reading, without diagnosis of hypertension 09/20/2021    Blood pressure elevated without history of HTN    Hypertension sept 2022    Personal history of other diseases of the female genital tract 06/21/2021    History of postmenopausal bleeding    Personal history of other medical treatment     History of screening mammography    Personal history of other specified conditions 06/06/2019    History of lymphadenopathy    Personal history of other specified conditions 09/20/2021    History of chest pain         Current Outpatient Medications:     cholecalciferol (Vitamin D3) 50 mcg (2,000 unit) capsule, Take 1 capsule (50 mcg) by mouth once daily., Disp: 90 capsule, Rfl: 3    Cephalexin, Penicillins, Iodine, and Varenicline    Family History   Problem Relation Name Age of Onset    Cancer Mother Gabbie         bladder    Diabetes Mother Gabbie     Heart disease Mother Gabbie     Anesthesia related problems Mother  "Gabbie     Arthritis Mother Gabbie     Heart disease Father Ed     Hypertension Father Ed     Rheumatologic disease Maternal Grandmother Pattie     Arthritis Maternal Grandmother Pattie     Breast cancer Maternal Grandmother Pattie        Past Surgical History:   Procedure Laterality Date    BREAST BIOPSY Right 2013    excisional bx benign    BREAST SURGERY Bilateral     IMPLANTS    OTHER SURGICAL HISTORY  06/06/2019    Breast biopsy    OTHER SURGICAL HISTORY  06/06/2019    Tubal ligation    OTHER SURGICAL HISTORY  06/06/2019    Breast augmentation    TUBAL LIGATION      WISDOM TOOTH EXTRACTION            Review of systems  Constitutional: No weight loss, fever, chills, weakness or fatigue  HEENT: No visual loss, blurred vision, double vision or yellow sclerae  Skin: No rash or itching  Cardiovascular: No chest pain, pressure or discomfort, No palpitations or edema.  Respiratory: No shortness of breath, cough or sputum  Gastrointestinal: No nausea, vomiting or diarrhea. No bloody or dark tarry stools.  Neurological: No headache, lightheadedness, dizziness, syncope.   Musculoskeletal: No muscle, back pain, joint pain or stiffness.  Hematologic: No anemia, bleeding or bruising.    /78 (BP Location: Left arm, Patient Position: Sitting)   Pulse 70   Ht 1.626 m (5' 4\")   Wt 69.4 kg (153 lb)   BMI 26.26 kg/m²     Patient Active Problem List   Diagnosis    Vitamin D deficiency    Tobacco use disorder    Positive D dimer    White coat syndrome without diagnosis of hypertension    Mixed hyperlipidemia    Chronic obstructive lung disease (Multi)    Abnormal mammogram    Allergic rhinitis    Nicotine use disorder    Menopausal symptom    Perimenopausal symptom    Carpal tunnel syndrome, right upper limb    Trigger finger of right thumb    Elevated coronary artery calcium score         Physical Exam  Constitutional: Well developed, awake/alert x 3, no distress.  Head/Neck: No JVD, No bruits  Respiratory/Thorax: patent " airways, CTAB, normal breath sounds with good expansion.  Cardiovascular: Regular rate and rhythm, no murmurs, normal S1 and S2,   Gastrointestinal: Non distended, soft, non-tender, no rebound tenderness or guarding.  Extremities: No cyanosis, edema.    Neurological: Alert and oriented x 3. Moves extremities spontaneous with purpose.  Psychological: Appropriate mood and behavior  Skin: Warm and Dry. No lesions or rashes.         Please excuse any errors in grammar or translation related to dictation, voice recognition software was used to prepare this document.

## 2024-12-13 ENCOUNTER — ANCILLARY PROCEDURE (OUTPATIENT)
Dept: CARDIOLOGY | Facility: HOSPITAL | Age: 54
End: 2024-12-13
Payer: COMMERCIAL

## 2024-12-13 DIAGNOSIS — I34.0 MITRAL VALVE INSUFFICIENCY, UNSPECIFIED ETIOLOGY: ICD-10-CM

## 2024-12-13 LAB
AORTIC VALVE MEAN GRADIENT: 5 MMHG
AORTIC VALVE PEAK VELOCITY: 1.29 M/S
AV PEAK GRADIENT: 7 MMHG
AVA (PEAK VEL): 2.44 CM2
AVA (VTI): 2.68 CM2
EJECTION FRACTION APICAL 4 CHAMBER: 52.2
EJECTION FRACTION: 53 %
LEFT VENTRICLE INTERNAL DIMENSION DIASTOLE: 4.5 CM (ref 3.5–6)
LEFT VENTRICULAR OUTFLOW TRACT DIAMETER: 2.3 CM
LV EJECTION FRACTION BIPLANE: 46 %
MITRAL VALVE E/A RATIO: 0.83
MITRAL VALVE E/E' RATIO: 7.5
RIGHT VENTRICLE PEAK SYSTOLIC PRESSURE: 26 MMHG

## 2024-12-13 PROCEDURE — 93306 TTE W/DOPPLER COMPLETE: CPT | Performed by: INTERNAL MEDICINE

## 2024-12-13 PROCEDURE — 93306 TTE W/DOPPLER COMPLETE: CPT

## 2024-12-20 ENCOUNTER — APPOINTMENT (OUTPATIENT)
Dept: UROLOGY | Facility: CLINIC | Age: 54
End: 2024-12-20
Payer: COMMERCIAL

## 2024-12-20 VITALS — DIASTOLIC BLOOD PRESSURE: 102 MMHG | HEART RATE: 80 BPM | TEMPERATURE: 97.3 F | SYSTOLIC BLOOD PRESSURE: 166 MMHG

## 2024-12-20 DIAGNOSIS — R31.0 GROSS HEMATURIA: ICD-10-CM

## 2024-12-20 LAB
POC APPEARANCE, URINE: CLEAR
POC BILIRUBIN, URINE: NEGATIVE
POC BLOOD, URINE: ABNORMAL
POC COLOR, URINE: YELLOW
POC GLUCOSE, URINE: NEGATIVE MG/DL
POC KETONES, URINE: ABNORMAL MG/DL
POC LEUKOCYTES, URINE: NEGATIVE
POC NITRITE,URINE: NEGATIVE
POC PH, URINE: 5.5 PH
POC PROTEIN, URINE: NEGATIVE MG/DL
POC SPECIFIC GRAVITY, URINE: >=1.03
POC UROBILINOGEN, URINE: 0.2 EU/DL

## 2024-12-20 PROCEDURE — G2211 COMPLEX E/M VISIT ADD ON: HCPCS | Performed by: UROLOGY

## 2024-12-20 PROCEDURE — 4004F PT TOBACCO SCREEN RCVD TLK: CPT | Performed by: UROLOGY

## 2024-12-20 PROCEDURE — 99204 OFFICE O/P NEW MOD 45 MIN: CPT | Performed by: UROLOGY

## 2024-12-20 PROCEDURE — 3080F DIAST BP >= 90 MM HG: CPT | Performed by: UROLOGY

## 2024-12-20 PROCEDURE — 81003 URINALYSIS AUTO W/O SCOPE: CPT | Performed by: UROLOGY

## 2024-12-20 PROCEDURE — 3077F SYST BP >= 140 MM HG: CPT | Performed by: UROLOGY

## 2024-12-20 RX ORDER — LORAZEPAM 1 MG/1
1 TABLET ORAL ONCE AS NEEDED
Qty: 1 TABLET | Refills: 0 | Status: SHIPPED | OUTPATIENT
Start: 2024-12-20

## 2024-12-20 RX ORDER — PREDNISONE 50 MG/1
50 TABLET ORAL SEE ADMIN INSTRUCTIONS
Qty: 3 TABLET | Refills: 0 | Status: SHIPPED | OUTPATIENT
Start: 2024-12-20

## 2024-12-20 NOTE — PROGRESS NOTES
History Of Present Illness  54-year-old female here to see me regarding gross hematuria  PMH: Smoking: Hyperlipidemia, hypertension  Social: 15 pack years, current smoking  UA 9/6/2024 and culture-culture negative, trace heme  Fhx: Mother had bladder ca  CT A/P with IV 9/30/24-5 mm nonobstructing stone in the right renal pelvis, some fatty infiltration of the bladder wall    Pt reports gross hematuria beginning August/September, intermittent. No other symptoms, no dysuria, urgency, frequency    Past Medical History  She has a past medical history of Allergic, Angina pectoris, unspecified (09/24/2021), Elevated blood-pressure reading, without diagnosis of hypertension (09/20/2021), Hypertension (sept 2022), Personal history of other diseases of the female genital tract (06/21/2021), Personal history of other medical treatment, Personal history of other specified conditions (06/06/2019), and Personal history of other specified conditions (09/20/2021).    Surgical History  She has a past surgical history that includes Other surgical history (06/06/2019); Other surgical history (06/06/2019); Other surgical history (06/06/2019); [place] tooth extraction; Tubal ligation; Breast surgery (Bilateral); and Breast biopsy (Right, 2013).     Social History  She reports that she has been smoking cigarettes. She has a 30 pack-year smoking history. She has never used smokeless tobacco. She reports current alcohol use of about 5.0 standard drinks of alcohol per week. She reports that she does not currently use drugs.    Family History  Family History   Problem Relation Name Age of Onset    Cancer Mother Gabbie         bladder    Diabetes Mother Gabbie     Heart disease Mother Gabbie     Anesthesia related problems Mother Gabbie     Arthritis Mother Gabbie     Heart disease Father Ed     Hypertension Father Ed     Rheumatologic disease Maternal Grandmother Pattie     Arthritis Maternal Grandmother Pattie     Breast cancer Maternal  Grandmother Pattie         Allergies  Cephalexin, Penicillins, Iodine, Latex, and Varenicline    ROS: 12 system review was completed and is negative with the exception of those signs and symptoms noted in the history of present illness: A 12 system review was completed and is negative with the exception of those signs and symptoms noted in the history of present illness.     Exam:  General: in NAD, appears stated age  Head: normocephalic, atraumatic  Respiratory: normal effort, no use of accessory muscles  Cardiovascular: no edema noted  Skin: normal turgor, no rashes  Neurologic: grossly intact, oriented to person/place/time  Psychiatric: mode and affect appropriate     Last Recorded Vitals  Blood pressure (!) 166/102, pulse 80, temperature 36.3 °C (97.3 °F).    Lab Results   Component Value Date    CREATININE 0.75 09/16/2024    HGB 14.3 09/16/2024         ASSESSMENT/PLAN:  # Gross hematuria, bladder abnormality on CT  -Based on location of the bladder abnormality I am somewhat worried about urachal remnant or carcinoma  -Urine cytology  -CT urogram, cystoscopy.  She is very nervous about cystoscopy, she will do it if her  is present  -Ativan 1 mg p.o. before hand    Aniceto Jara MD

## 2024-12-26 ENCOUNTER — LAB (OUTPATIENT)
Dept: LAB | Facility: LAB | Age: 54
End: 2024-12-26
Payer: COMMERCIAL

## 2024-12-26 DIAGNOSIS — R31.0 GROSS HEMATURIA: ICD-10-CM

## 2024-12-26 LAB
CREAT SERPL-MCNC: 0.76 MG/DL (ref 0.5–1.05)
EGFRCR SERPLBLD CKD-EPI 2021: >90 ML/MIN/1.73M*2

## 2024-12-26 PROCEDURE — 82565 ASSAY OF CREATININE: CPT

## 2024-12-27 RX ORDER — LIDOCAINE HYDROCHLORIDE 20 MG/ML
1 JELLY TOPICAL ONCE
Status: COMPLETED | OUTPATIENT
Start: 2025-01-02 | End: 2025-01-02

## 2024-12-30 ENCOUNTER — HOSPITAL ENCOUNTER (OUTPATIENT)
Dept: RADIOLOGY | Facility: CLINIC | Age: 54
End: 2024-12-30
Payer: COMMERCIAL

## 2024-12-30 ENCOUNTER — HOSPITAL ENCOUNTER (OUTPATIENT)
Dept: RADIOLOGY | Facility: HOSPITAL | Age: 54
Discharge: HOME | End: 2024-12-30
Payer: COMMERCIAL

## 2024-12-30 DIAGNOSIS — R31.0 GROSS HEMATURIA: ICD-10-CM

## 2024-12-30 PROCEDURE — 2550000001 HC RX 255 CONTRASTS: Performed by: UROLOGY

## 2024-12-30 PROCEDURE — 76377 3D RENDER W/INTRP POSTPROCES: CPT | Performed by: RADIOLOGY

## 2024-12-30 PROCEDURE — 74178 CT ABD&PLV WO CNTR FLWD CNTR: CPT

## 2024-12-30 PROCEDURE — 74178 CT ABD&PLV WO CNTR FLWD CNTR: CPT | Performed by: RADIOLOGY

## 2024-12-30 RX ADMIN — IOHEXOL 100 ML: 350 INJECTION, SOLUTION INTRAVENOUS at 10:38

## 2025-01-02 ENCOUNTER — PROCEDURE VISIT (OUTPATIENT)
Dept: UROLOGY | Facility: CLINIC | Age: 55
End: 2025-01-02
Payer: COMMERCIAL

## 2025-01-02 VITALS — HEART RATE: 96 BPM | SYSTOLIC BLOOD PRESSURE: 158 MMHG | TEMPERATURE: 95.7 F | DIASTOLIC BLOOD PRESSURE: 103 MMHG

## 2025-01-02 DIAGNOSIS — R31.0 GROSS HEMATURIA: ICD-10-CM

## 2025-01-02 PROCEDURE — 99214 OFFICE O/P EST MOD 30 MIN: CPT | Performed by: UROLOGY

## 2025-01-02 PROCEDURE — 52000 CYSTOURETHROSCOPY: CPT | Performed by: UROLOGY

## 2025-01-02 PROCEDURE — 2500000005 HC RX 250 GENERAL PHARMACY W/O HCPCS: Performed by: UROLOGY

## 2025-01-02 PROCEDURE — 99214 OFFICE O/P EST MOD 30 MIN: CPT | Mod: 25 | Performed by: UROLOGY

## 2025-01-02 RX ADMIN — LIDOCAINE HYDROCHLORIDE 1 APPLICATION: 20 JELLY TOPICAL at 11:30

## 2025-01-02 ASSESSMENT — ENCOUNTER SYMPTOMS
LOSS OF SENSATION IN FEET: 0
DEPRESSION: 0
OCCASIONAL FEELINGS OF UNSTEADINESS: 0

## 2025-01-02 ASSESSMENT — PATIENT HEALTH QUESTIONNAIRE - PHQ9
SUM OF ALL RESPONSES TO PHQ9 QUESTIONS 1 AND 2: 0
1. LITTLE INTEREST OR PLEASURE IN DOING THINGS: NOT AT ALL
2. FEELING DOWN, DEPRESSED OR HOPELESS: NOT AT ALL

## 2025-01-09 ENCOUNTER — APPOINTMENT (OUTPATIENT)
Dept: UROLOGY | Facility: CLINIC | Age: 55
End: 2025-01-09
Payer: COMMERCIAL

## 2025-02-25 ENCOUNTER — APPOINTMENT (OUTPATIENT)
Dept: CARDIOLOGY | Facility: CLINIC | Age: 55
End: 2025-02-25
Payer: COMMERCIAL

## 2025-03-25 ENCOUNTER — APPOINTMENT (OUTPATIENT)
Dept: CARDIOLOGY | Facility: CLINIC | Age: 55
End: 2025-03-25
Payer: COMMERCIAL

## 2025-03-25 VITALS
DIASTOLIC BLOOD PRESSURE: 92 MMHG | WEIGHT: 149 LBS | HEART RATE: 72 BPM | HEIGHT: 64 IN | BODY MASS INDEX: 25.44 KG/M2 | SYSTOLIC BLOOD PRESSURE: 162 MMHG

## 2025-03-25 DIAGNOSIS — E78.00 PURE HYPERCHOLESTEROLEMIA: ICD-10-CM

## 2025-03-25 DIAGNOSIS — I10 PRIMARY HYPERTENSION: Primary | ICD-10-CM

## 2025-03-25 DIAGNOSIS — R93.1 ELEVATED CORONARY ARTERY CALCIUM SCORE: ICD-10-CM

## 2025-03-25 DIAGNOSIS — F17.200 TOBACCO USE DISORDER: ICD-10-CM

## 2025-03-25 PROCEDURE — 3080F DIAST BP >= 90 MM HG: CPT | Performed by: INTERNAL MEDICINE

## 2025-03-25 PROCEDURE — G2211 COMPLEX E/M VISIT ADD ON: HCPCS | Performed by: INTERNAL MEDICINE

## 2025-03-25 PROCEDURE — 99214 OFFICE O/P EST MOD 30 MIN: CPT | Performed by: INTERNAL MEDICINE

## 2025-03-25 PROCEDURE — 3008F BODY MASS INDEX DOCD: CPT | Performed by: INTERNAL MEDICINE

## 2025-03-25 PROCEDURE — 3077F SYST BP >= 140 MM HG: CPT | Performed by: INTERNAL MEDICINE

## 2025-03-25 RX ORDER — ROSUVASTATIN CALCIUM 10 MG/1
10 TABLET, COATED ORAL DAILY
Qty: 90 TABLET | Refills: 3 | Status: SHIPPED | OUTPATIENT
Start: 2025-03-25 | End: 2026-03-25

## 2025-03-25 RX ORDER — AMLODIPINE BESYLATE 5 MG/1
5 TABLET ORAL DAILY
Qty: 90 TABLET | Refills: 3 | Status: SHIPPED | OUTPATIENT
Start: 2025-03-25 | End: 2026-03-25

## 2025-03-25 NOTE — PATIENT INSTRUCTIONS
NEW:  AMLODIPINE 5 MG 1 TABLET DAILY    NEW:  ROSUVASTATIN 10 MG 1 TABLET DAILY    FASTING LABS TO BE DONE 5-7 DAYS PRIOR TO YOUR APPOINTMENT WITH DR MILLER IN 3 MONTHS    STOP SMOKING.  SMOKING MAKE YOUR PLATELETS STICKY.  THEY COULD STICK TO ANY IRREGULARITIES IN YOUR HEART ARTERIES.  SMOKING ALSO ALLOWS CHOLESTEROL TO DEPOSIT IN YOUR ARTERIES VERY EASILY    AVOID PROCESSED FOODS, THEY USUALLY ARE HIGH IN SODIUM.  SODIUM IS BAD FOR BLOOD PRESSURE.  EAT FRESH OR FROZEN VEGETABLES AND FRESH MEATS.  AVOID SUASUAGES, HOT DOGS AND LUNCH MEATS.  AVOID FAST FOOD.    Please bring all medicines, vitamins, and herbal supplements with you in original bottles to every appointment! This is the best way to ensure your medication list in your chart is accurate.    Prescriptions will not be filled unless you are compliant with your follow up appointments or have a follow up appointment scheduled as per instruction of your physician. Refills should be requested at the time of your visit.    DID YOU KNOW  We have a pharmacy here in the [facility].  They can fill all prescriptions, not just cardiac medications.  Prescriptions from other pharmacies can easily be transferred to the  pharmacy by the  pharmacist on site.   pharmacies offer FREE HOME DELIVERY on medications to anywhere in [place]. They can sync your medications. Typically prescriptions can be ready in 10 - 15 minutes. If pharmacy is unable to fill your  prescription or if cost is more than your paying now the Pharmacist can easily transfer back to your Pharmacy of choice. Pharmacy phone # [phone].

## 2025-03-25 NOTE — PROGRESS NOTES
Patient:  Kerri Comer  YOB: 1970  MRN: [id number]       Impression/Plan:     Diagnoses and all orders for this visit:  Primary hypertension  -     amLODIPine (Norvasc) 5 mg tablet; Take 1 tablet (5 mg) by mouth once daily.  -     Blood pressure is poorly controlled and frequently diastolics in excess of 90 on prior office visits documented in epic here and at [facility].  -     I reviewed with her pathophysiology of hypertension and its adverse effects as to risk of stroke, CHF and renal failure.  She had no side effects of amlodipine and will institute 5 mg daily.  Also discussed the importance of avoidance of processed foods and high sodium products  Pure hypercholesterolemia  -    Her calcium score is only 5 but she is only 54 years old which puts her at the 79th percentile.  Given her mother's history of stroke I think aggressive reduction of plaque load would be much to her benefit.  LDL is in excess of 120 and therefore will institute Crestor 10 a day discussed some of the possible side effects with her she understands agrees to proceed  -     rosuvastatin (Crestor) 10 mg tablet; Take 1 tablet (10 mg) by mouth once daily.  -     Follow Up In Cardiology; Future  -     Lipid Panel; Future  Elevated coronary artery calcium score  -    Reviewed with her that elevated calcium score does indeed represent plaque within the coronary though probably relatively mild at this time and that she has absolutely no symptoms at a high functional capacity.  Aggressive reduction of cholesterol will be much to her benefit in the long-term  -     rosuvastatin (Crestor) 10 mg tablet; Take 1 tablet (10 mg) by mouth once daily.  -     Follow Up In Cardiology; Future  -     Lipid Panel; Future  Tobacco use disorder        -    Discussed with her that even more than blood pressure and cholesterol continued tobacco abuse is a very high risk for developing severe vascular disease.  She has stopped before.  She  believes she can stop again no one else at her house smokes.  She has tried medical interventions in the past and no avail    Mitral regurgitation  Mitral sufficiency noted as 1+ I believe it to be trace MR not clinically significant.  I do not see a need for serial echo studies.  There is no difference from the study 4 years ago      Chief Complaint/Active Symptoms:      Chief Complaint   Patient presents with    Follow-up     FOUR MONTH FOLLOW UP        Kerri Comer is a 54 y.o. female who presents with hyperlipidemia, COPD, calcium score of 5 as of November 2024.  History of tobacco abuse as well.    She had seen Sahra Nash as new consultation for the calcium score of 5 on 11/21/24.  Testing as noted below.  Patient did have cholesterol 197 HDL 58 .  She was uninterested in statin therapy or stopping cigarette smoking.  Repeat echo was ordered as there was report of MR on echo 2021.  Seen 2021 had been done at [facility] also is recommended she have treatment for her hypertension noted at that time as well.  Blood pressure is elevated that time 142/78.     11/19/2024 CT lung screening/CA score.       Calcium score 5 no thoracic disease    10/20/2021 echocardiogram   EF 60%.  1+ MR    10/20/2021 Nuclear perfusion stress test   Normal    12/13/2024 echocardiography   1. The left ventricular systolic function is low normal, with a visually estimated ejection fraction of 50-55%.   2. There is normal right ventricular global systolic function.   3. Mildly thickened mitral valve leaflet tips with 1+ MR.   4. Right ventricular systolic pressure is within normal limits.   5. Normal aortic valve.    She denies angina, dyspnea, palpitation, edema, lightheadedness or syncope.  She has had no symptoms of claudication or neurologic deterioration.  There have been no hospitalizations or emergency room visits since last office visit.    Overall she has been doing well but does have a feeling that maybe her  blood pressure has been elevated.  She has had no chest tightness or shortness of breath except occasionally gets out of breath with coughing from cigarette smoking.  She says that is baseline.  She does have a family history of hypertension and some family history of vascular disease her mother having had a stroke.    I reviewed past office visits various doctors office blood pressures consistently elevated although last visit with primary care was upper normal.  She had had no side effects of Norvasc when she was taking it in 2021 she just kind of forgot to take the medicine.      Review of Systems: Unremarkable except as noted above    Meds     Current Outpatient Medications   Medication Instructions    cholecalciferol (VITAMIN D3) 50 mcg, oral, Daily    diphenhydrAMINE (BENADRYL) 50 mg, oral, See admin instructions, Take 1 tablet 1 hour prior to exam.    LORazepam (ATIVAN) 1 mg, oral, Once as needed, Take 1 hour before cystoscopy    predniSONE (DELTASONE) 50 mg, oral, See admin instructions, Take 1 Tablet 13 hours prior to exam, take 1 tablet 7 hours prior to exam, and take 1 tablet 1 hour prior to exam.        Allergies     Allergies   Allergen Reactions    Cephalexin Anaphylaxis    Penicillins Anaphylaxis    Iodine Other    Latex Unknown    Varenicline Other     Homicidal feeling         Annotated Problems     Specialty Problems          Cardiology Problems    Positive D dimer    Nicotine use disorder    White coat syndrome without diagnosis of hypertension    Mixed hyperlipidemia    Tobacco use disorder    Elevated coronary artery calcium score        Problem List     Patient Active Problem List    Diagnosis Date Noted    Elevated coronary artery calcium score 11/21/2024    Allergic rhinitis 09/18/2023    Menopausal symptom 09/18/2023    Vitamin D deficiency 05/12/2023    Tobacco use disorder 05/12/2023    Mixed hyperlipidemia 05/12/2023    Chronic obstructive lung disease (Multi) 05/12/2023    White coat  "syndrome without diagnosis of hypertension 09/22/2021    Nicotine use disorder 09/22/2021    Positive D dimer 09/21/2021    Perimenopausal symptom 01/17/2019    Abnormal mammogram 04/04/2013    Carpal tunnel syndrome, right upper limb 02/20/2024    Trigger finger of right thumb 02/20/2024       Objective:     Vitals:    03/25/25 1611   BP: (!) 162/92   BP Location: Left arm   Patient Position: Sitting   Pulse: 72   Weight: 67.6 kg (149 lb)   Height: 1.626 m (5' 4\")      Wt Readings from Last 4 Encounters:   03/25/25 67.6 kg (149 lb)   11/21/24 69.4 kg (153 lb)   09/30/24 68 kg (150 lb)   09/24/24 68.5 kg (151 lb)           LAB:     Lab Results   Component Value Date    WBC 6.6 09/16/2024    HGB 14.3 09/16/2024    HCT 43.8 09/16/2024     09/16/2024    CHOL 197 09/16/2024    TRIG 103 09/16/2024    HDL 57.7 09/16/2024    ALT 13 09/16/2024    AST 16 09/16/2024     09/16/2024    K 3.8 09/16/2024     09/16/2024    CREATININE 0.76 12/26/2024    BUN 11 09/16/2024    CO2 30 09/16/2024    TSH 1.06 09/16/2024    INR 1.0 09/20/2021         Physical Exam     General Appearance: alert and oriented to person, place and time, in no acute distress  Cardiovascular: normal rate, regular rhythm, normal S1 and S2, no murmurs, rubs, clicks, or gallops,  no JVD  Pulmonary/Chest: clear to auscultation bilaterally- no wheezes, rales or rhonchi, normal air movement, no respiratory distress  Abdomen: soft, non-tender, non-distended, normal bowel sounds, no masses   Extremities: no cyanosis, clubbing or edema  Skin: warm and dry, no rash or erythema  Eyes: EOMI  Neck: supple and non-tender without mass, no thyromegaly   Neurological: alert, oriented, normal speech, no focal findings or movement disorder noted  Vascular:       Provider attestation-scribe documentation  Any medical record entries made by the scribe were at my discretion and personally dictated by me.  I have reviewed the chart and agree that the record " accurately reflects my personal performance of the history, physical exam, discussion and plan.

## 2025-05-08 ENCOUNTER — APPOINTMENT (OUTPATIENT)
Dept: UROLOGY | Facility: CLINIC | Age: 55
End: 2025-05-08
Payer: COMMERCIAL

## 2025-06-09 LAB
ALBUMIN SERPL-MCNC: 4.4 G/DL (ref 3.6–5.1)
ALP SERPL-CCNC: 110 U/L (ref 37–153)
ALT SERPL-CCNC: 12 U/L (ref 6–29)
ANION GAP SERPL CALCULATED.4IONS-SCNC: 9 MMOL/L (CALC) (ref 7–17)
AST SERPL-CCNC: 15 U/L (ref 10–35)
BASOPHILS # BLD AUTO: 49 CELLS/UL (ref 0–200)
BASOPHILS NFR BLD AUTO: 0.9 %
BILIRUB SERPL-MCNC: 0.5 MG/DL (ref 0.2–1.2)
BUN SERPL-MCNC: 12 MG/DL (ref 7–25)
CALCIUM SERPL-MCNC: 9 MG/DL (ref 8.6–10.4)
CHLORIDE SERPL-SCNC: 105 MMOL/L (ref 98–110)
CHOLEST SERPL-MCNC: 201 MG/DL
CHOLEST/HDLC SERPL: 2.9 (CALC)
CO2 SERPL-SCNC: 28 MMOL/L (ref 20–32)
CREAT SERPL-MCNC: 0.74 MG/DL (ref 0.5–1.03)
EGFRCR SERPLBLD CKD-EPI 2021: 96 ML/MIN/1.73M2
EOSINOPHIL # BLD AUTO: 97 CELLS/UL (ref 15–500)
EOSINOPHIL NFR BLD AUTO: 1.8 %
ERYTHROCYTE [DISTWIDTH] IN BLOOD BY AUTOMATED COUNT: 13.3 % (ref 11–15)
GLUCOSE SERPL-MCNC: 91 MG/DL (ref 65–99)
HCT VFR BLD AUTO: 46.9 % (ref 35–45)
HDLC SERPL-MCNC: 69 MG/DL
HGB BLD-MCNC: 15.2 G/DL (ref 11.7–15.5)
LDLC SERPL CALC-MCNC: 113 MG/DL (CALC)
LYMPHOCYTES # BLD AUTO: 1863 CELLS/UL (ref 850–3900)
LYMPHOCYTES NFR BLD AUTO: 34.5 %
MAGNESIUM SERPL-MCNC: 2.2 MG/DL (ref 1.5–2.5)
MCH RBC QN AUTO: 32.5 PG (ref 27–33)
MCHC RBC AUTO-ENTMCNC: 32.4 G/DL (ref 32–36)
MCV RBC AUTO: 100.4 FL (ref 80–100)
MONOCYTES # BLD AUTO: 367 CELLS/UL (ref 200–950)
MONOCYTES NFR BLD AUTO: 6.8 %
NEUTROPHILS # BLD AUTO: 3024 CELLS/UL (ref 1500–7800)
NEUTROPHILS NFR BLD AUTO: 56 %
NONHDLC SERPL-MCNC: 132 MG/DL (CALC)
PLATELET # BLD AUTO: 204 THOUSAND/UL (ref 140–400)
PMV BLD REES-ECKER: 9.6 FL (ref 7.5–12.5)
POTASSIUM SERPL-SCNC: 4.2 MMOL/L (ref 3.5–5.3)
PROT SERPL-MCNC: 6.8 G/DL (ref 6.1–8.1)
RBC # BLD AUTO: 4.67 MILLION/UL (ref 3.8–5.1)
SODIUM SERPL-SCNC: 142 MMOL/L (ref 135–146)
TRIGL SERPL-MCNC: 86 MG/DL
TSH SERPL-ACNC: 1.53 MIU/L
WBC # BLD AUTO: 5.4 THOUSAND/UL (ref 3.8–10.8)

## 2025-06-12 ENCOUNTER — APPOINTMENT (OUTPATIENT)
Dept: PRIMARY CARE | Facility: CLINIC | Age: 55
End: 2025-06-12
Payer: COMMERCIAL

## 2025-06-12 ENCOUNTER — HOSPITAL ENCOUNTER (OUTPATIENT)
Dept: RADIOLOGY | Facility: HOSPITAL | Age: 55
Discharge: HOME | End: 2025-06-12
Payer: COMMERCIAL

## 2025-06-12 VITALS
TEMPERATURE: 97.8 F | HEART RATE: 76 BPM | OXYGEN SATURATION: 96 % | WEIGHT: 143.4 LBS | HEIGHT: 64 IN | DIASTOLIC BLOOD PRESSURE: 81 MMHG | SYSTOLIC BLOOD PRESSURE: 142 MMHG | BODY MASS INDEX: 24.48 KG/M2 | RESPIRATION RATE: 16 BRPM

## 2025-06-12 DIAGNOSIS — Z00.00 ROUTINE GENERAL MEDICAL EXAMINATION AT A HEALTH CARE FACILITY: Primary | ICD-10-CM

## 2025-06-12 DIAGNOSIS — J44.9 CHRONIC OBSTRUCTIVE PULMONARY DISEASE, UNSPECIFIED COPD TYPE (MULTI): ICD-10-CM

## 2025-06-12 DIAGNOSIS — M19.041 OSTEOARTHRITIS OF BOTH HANDS, UNSPECIFIED OSTEOARTHRITIS TYPE: ICD-10-CM

## 2025-06-12 DIAGNOSIS — F17.210 CIGARETTE SMOKER: ICD-10-CM

## 2025-06-12 DIAGNOSIS — M19.042 OSTEOARTHRITIS OF BOTH HANDS, UNSPECIFIED OSTEOARTHRITIS TYPE: ICD-10-CM

## 2025-06-12 DIAGNOSIS — Z12.11 ENCOUNTER FOR SCREENING FOR MALIGNANT NEOPLASM OF COLON: ICD-10-CM

## 2025-06-12 DIAGNOSIS — E78.2 MIXED HYPERLIPIDEMIA: ICD-10-CM

## 2025-06-12 DIAGNOSIS — I10 PRIMARY HYPERTENSION: ICD-10-CM

## 2025-06-12 PROCEDURE — 3008F BODY MASS INDEX DOCD: CPT | Performed by: FAMILY MEDICINE

## 2025-06-12 PROCEDURE — 99396 PREV VISIT EST AGE 40-64: CPT | Performed by: FAMILY MEDICINE

## 2025-06-12 PROCEDURE — 4004F PT TOBACCO SCREEN RCVD TLK: CPT | Performed by: FAMILY MEDICINE

## 2025-06-12 PROCEDURE — 3079F DIAST BP 80-89 MM HG: CPT | Performed by: FAMILY MEDICINE

## 2025-06-12 PROCEDURE — 73130 X-RAY EXAM OF HAND: CPT | Mod: LEFT SIDE | Performed by: RADIOLOGY

## 2025-06-12 PROCEDURE — 3077F SYST BP >= 140 MM HG: CPT | Performed by: FAMILY MEDICINE

## 2025-06-12 PROCEDURE — 73130 X-RAY EXAM OF HAND: CPT | Mod: LT

## 2025-06-12 RX ORDER — AMLODIPINE BESYLATE 5 MG/1
5 TABLET ORAL DAILY
Qty: 90 TABLET | Refills: 3 | Status: SHIPPED | OUTPATIENT
Start: 2025-06-12 | End: 2026-06-12

## 2025-06-12 ASSESSMENT — ANXIETY QUESTIONNAIRES
4. TROUBLE RELAXING: NOT AT ALL
5. BEING SO RESTLESS THAT IT IS HARD TO SIT STILL: NOT AT ALL
GAD7 TOTAL SCORE: 0
1. FEELING NERVOUS, ANXIOUS, OR ON EDGE: NOT AT ALL
6. BECOMING EASILY ANNOYED OR IRRITABLE: NOT AT ALL
7. FEELING AFRAID AS IF SOMETHING AWFUL MIGHT HAPPEN: NOT AT ALL
2. NOT BEING ABLE TO STOP OR CONTROL WORRYING: NOT AT ALL
IF YOU CHECKED OFF ANY PROBLEMS ON THIS QUESTIONNAIRE, HOW DIFFICULT HAVE THESE PROBLEMS MADE IT FOR YOU TO DO YOUR WORK, TAKE CARE OF THINGS AT HOME, OR GET ALONG WITH OTHER PEOPLE: NOT DIFFICULT AT ALL
3. WORRYING TOO MUCH ABOUT DIFFERENT THINGS: NOT AT ALL

## 2025-06-12 ASSESSMENT — ENCOUNTER SYMPTOMS
EYE REDNESS: 0
DYSURIA: 0
FEVER: 0
SHORTNESS OF BREATH: 0
DYSPHORIC MOOD: 0
HALLUCINATIONS: 0
WHEEZING: 0
HEMATURIA: 0
SPEECH DIFFICULTY: 0
BACK PAIN: 0
CHILLS: 0
CHOKING: 0
HEADACHES: 0
DIAPHORESIS: 0
FACIAL ASYMMETRY: 0
FATIGUE: 0
TROUBLE SWALLOWING: 0
AGITATION: 0
CHEST TIGHTNESS: 0
POLYDIPSIA: 0
SORE THROAT: 0
EYE ITCHING: 0
ABDOMINAL DISTENTION: 0
SEIZURES: 0
PHOTOPHOBIA: 0
NECK STIFFNESS: 0
APPETITE CHANGE: 0
ACTIVITY CHANGE: 0
EYE DISCHARGE: 0
LIGHT-HEADEDNESS: 0
VOICE CHANGE: 0
NUMBNESS: 0
DIZZINESS: 0
BLOOD IN STOOL: 0
EYE PAIN: 0
HYPERTENSION: 1
ARTHRALGIAS: 0
TREMORS: 0
VOMITING: 0
WOUND: 0
PALPITATIONS: 0
JOINT SWELLING: 0
COUGH: 0
CONFUSION: 0
BRUISES/BLEEDS EASILY: 0
CONSTIPATION: 0
ABDOMINAL PAIN: 0
SLEEP DISTURBANCE: 0
ADENOPATHY: 0
DIARRHEA: 0
WEAKNESS: 0
SINUS PRESSURE: 0
FREQUENCY: 0
MYALGIAS: 0
NERVOUS/ANXIOUS: 0
NECK PAIN: 0
RHINORRHEA: 0
NAUSEA: 0
FLANK PAIN: 0
UNEXPECTED WEIGHT CHANGE: 0

## 2025-06-12 ASSESSMENT — PATIENT HEALTH QUESTIONNAIRE - PHQ9
2. FEELING DOWN, DEPRESSED OR HOPELESS: NOT AT ALL
1. LITTLE INTEREST OR PLEASURE IN DOING THINGS: NOT AT ALL
SUM OF ALL RESPONSES TO PHQ9 QUESTIONS 1 AND 2: 0

## 2025-06-12 NOTE — PROGRESS NOTES
Subjective   Patient ID: Kerri Comer is a 54 y.o. female who presents for Follow-up (Check BP after starting new med) and Blood in Urine (Pt states she still has blood in her urine. Pt gets abdominal pain. ).  Subjective  Kerri Comer is a 54 y.o. female and is here for a comprehensive physical exam. The patient reports still having some irritative voiding symptoms especially after physical activity.  Needs to reschedule follow-up with urology.  Still smoking about the same.    Do you take any herbs or supplements that were not prescribed by a doctor? no  Are you taking calcium supplements? no  Are you taking aspirin daily? no      History:  LMP: No LMP recorded. Patient is postmenopausal.    Hypertension  Pertinent negatives include no chest pain, headaches, neck pain, palpitations or shortness of breath.   Hyperlipidemia  Pertinent negatives include no chest pain, myalgias or shortness of breath.       Review of Systems   Constitutional:  Negative for activity change, appetite change, chills, diaphoresis, fatigue, fever and unexpected weight change.   HENT:  Negative for congestion, ear pain, hearing loss, nosebleeds, postnasal drip, rhinorrhea, sinus pressure, sneezing, sore throat, tinnitus, trouble swallowing and voice change.    Eyes:  Negative for photophobia, pain, discharge, redness, itching and visual disturbance.   Respiratory:  Negative for cough, choking, chest tightness, shortness of breath and wheezing.    Cardiovascular:  Negative for chest pain, palpitations and leg swelling.   Gastrointestinal:  Negative for abdominal distention, abdominal pain, blood in stool, constipation, diarrhea, nausea and vomiting.   Endocrine: Negative for cold intolerance, heat intolerance, polydipsia and polyuria.   Genitourinary:  Negative for dysuria, flank pain, frequency, hematuria and urgency.   Musculoskeletal:  Negative for arthralgias, back pain, joint swelling, myalgias, neck pain and neck stiffness.  "  Skin:  Negative for rash and wound.   Allergic/Immunologic: Negative for immunocompromised state.   Neurological:  Negative for dizziness, tremors, seizures, syncope, facial asymmetry, speech difficulty, weakness, light-headedness, numbness and headaches.   Hematological:  Negative for adenopathy. Does not bruise/bleed easily.   Psychiatric/Behavioral:  Negative for agitation, behavioral problems, confusion, dysphoric mood, hallucinations, self-injury, sleep disturbance and suicidal ideas. The patient is not nervous/anxious.        Objective   /81   Pulse 76   Temp 36.6 °C (97.8 °F) (Temporal)   Resp 16   Ht 1.626 m (5' 4\")   Wt 65 kg (143 lb 6.4 oz)   SpO2 96%   BMI 24.61 kg/m²   Physical Exam  Constitutional:       General: She is not in acute distress.     Appearance: Normal appearance. She is not ill-appearing or diaphoretic.   HENT:      Head: Normocephalic and atraumatic.      Right Ear: External ear normal.      Left Ear: External ear normal.      Nose: Nose normal. No rhinorrhea.   Eyes:      General: Lids are normal. No scleral icterus.        Right eye: No discharge.         Left eye: No discharge.      Conjunctiva/sclera: Conjunctivae normal.   Cardiovascular:      Rate and Rhythm: Regular rhythm.      Pulses: Normal pulses.      Heart sounds: No murmur heard.  Pulmonary:      Effort: Pulmonary effort is normal. No respiratory distress.      Breath sounds: Normal breath sounds. No decreased breath sounds, wheezing, rhonchi or rales.   Abdominal:      General: Bowel sounds are normal. There is no distension.      Palpations: Abdomen is soft. There is no mass.      Tenderness: There is no abdominal tenderness. There is no guarding or rebound.   Musculoskeletal:         General: No swelling, tenderness or deformity.      Cervical back: No rigidity or tenderness.      Right lower leg: No edema.      Left lower leg: No edema.   Lymphadenopathy:      Cervical: No cervical adenopathy.      Upper " Body:      Right upper body: No supraclavicular adenopathy.      Left upper body: No supraclavicular adenopathy.   Skin:     General: Skin is warm and dry.      Coloration: Skin is not jaundiced or pale.      Findings: No erythema, lesion or rash.   Neurological:      General: No focal deficit present.      Mental Status: She is alert and oriented to person, place, and time.      Sensory: No sensory deficit.      Motor: No weakness or tremor.      Coordination: Coordination normal.      Gait: Gait normal.   Psychiatric:         Mood and Affect: Mood normal. Affect is not inappropriate.         Behavior: Behavior normal.       Assessment/Plan   Problem List Items Addressed This Visit       Mixed hyperlipidemia    Relevant Orders    Comprehensive Metabolic Panel    Lipid Panel    TSH with reflex to Free T4 if abnormal    Follow Up In [facility] - PCP - Health Maintenance    RESOLVED: Chronic obstructive lung disease (Multi)    Relevant Orders    CBC and Auto Differential    Magnesium    Follow Up In [facility] - [facility] - Health Maintenance    Cigarette smoker    Relevant Orders    CT lung screening low dose    Follow Up In [facility] - [facility] - Health Maintenance    Primary hypertension    Relevant Medications    amLODIPine (Norvasc) 5 mg tablet    Other Relevant Orders    CBC and Auto Differential    Comprehensive Metabolic Panel    Lipid Panel    Magnesium    TSH with reflex to Free T4 if abnormal    Follow Up In [facility] - [facility] - Health Maintenance     Other Visit Diagnoses         Routine general medical examination at a health care facility    -  Primary    Relevant Orders    Follow Up In [facility] - [facility] - Health Maintenance      Encounter for screening for malignant neoplasm of colon        Relevant Orders    Cologuard® colon cancer screening    Follow Up In [facility] - [facility] - [facility]      Osteoarthritis of both hands, unspecified  osteoarthritis type        Relevant Orders    AYANA with Reflex to RADHA    C-Reactive Protein    Creatine Kinase    Rheumatoid Factor    Sedimentation Rate    Uric Acid    Vitamin D 25-Hydroxy,Total (for eval of Vitamin D levels)    XR hand left 3+ views    Follow Up In [facility] - PCP - Health Maintenance           2. Patient Counseling:  --Smoking cessation discussed.  Patient states understanding and acceptance of risks of continued tobacco use  --Nutrition: Stressed importance of moderation in sodium/caffeine intake, saturated fat and cholesterol, caloric balance, sufficient intake of fresh fruits, vegetables, fiber, calcium, iron.  --Exercise: Stressed the importance of regular exercise.   --Substance Abuse: Discussed cessation/primary prevention of tobacco, alcohol, or other drug use; driving or other dangerous activities under the influence; availability of treatment for abuse.   --Injury prevention: Discussed safety belts, safety helmets, smoke detector, smoking near bedding or upholstery.   --Dental health: Discussed importance of regular tooth brushing, flossing, and dental visits.  --Immunizations reviewed.  --Discussed benefits of screening colonoscopy.Cologuard due 6/2025  3. Discussed the patient's BMI with her.  The BMI is in the acceptable range.  4. Follow up 1 yr w labs

## 2025-06-13 LAB
25(OH)D3+25(OH)D2 SERPL-MCNC: 43 NG/ML (ref 30–100)
ANA SER QL IF: NORMAL
CK SERPL-CCNC: 89 U/L (ref 21–240)
CRP SERPL-MCNC: 5.9 MG/L
ERYTHROCYTE [SEDIMENTATION RATE] IN BLOOD BY WESTERGREN METHOD: 6 MM/H
RHEUMATOID FACT SERPL-ACNC: <10 IU/ML
URATE SERPL-MCNC: 4.8 MG/DL (ref 2.5–7)

## 2025-06-17 LAB
25(OH)D3+25(OH)D2 SERPL-MCNC: 43 NG/ML (ref 30–100)
ANA PAT SER IF-IMP: ABNORMAL
ANA SER QL IF: POSITIVE
ANA TITR SER IF: ABNORMAL TITER
CENTROMERE B AB SER-ACNC: ABNORMAL AI
CK SERPL-CCNC: 89 U/L (ref 21–240)
CRP SERPL-MCNC: 5.9 MG/L
DSDNA AB SER-ACNC: <1 IU/ML
ENA JO1 AB SER IA-ACNC: ABNORMAL AI
ENA RNP AB SER-ACNC: ABNORMAL AI
ENA SCL70 AB SER IA-ACNC: ABNORMAL AI
ENA SM AB SER IA-ACNC: ABNORMAL AI
ENA SM+RNP AB SER IA-ACNC: ABNORMAL AI
ENA SS-A AB SER IA-ACNC: ABNORMAL AI
ENA SS-B AB SER IA-ACNC: ABNORMAL AI
ERYTHROCYTE [SEDIMENTATION RATE] IN BLOOD BY WESTERGREN METHOD: 6 MM/H
LABORATORY COMMENT REPORT: ABNORMAL
NUCLEOSOME AB SER IA-ACNC: ABNORMAL AI
RHEUMATOID FACT SERPL-ACNC: <10 IU/ML
RIBOSOMAL P AB SER-ACNC: ABNORMAL AI
URATE SERPL-MCNC: 4.8 MG/DL (ref 2.5–7)

## 2025-06-21 LAB — NONINV COLON CA DNA+OCC BLD SCRN STL QL: NEGATIVE

## 2025-06-25 DIAGNOSIS — E78.00 PURE HYPERCHOLESTEROLEMIA: ICD-10-CM

## 2025-06-25 DIAGNOSIS — R93.1 ELEVATED CORONARY ARTERY CALCIUM SCORE: ICD-10-CM

## 2025-07-03 ENCOUNTER — OFFICE VISIT (OUTPATIENT)
Dept: URGENT CARE | Age: 55
End: 2025-07-03
Payer: COMMERCIAL

## 2025-07-03 VITALS
SYSTOLIC BLOOD PRESSURE: 150 MMHG | TEMPERATURE: 98.2 F | HEART RATE: 85 BPM | OXYGEN SATURATION: 98 % | RESPIRATION RATE: 18 BRPM | DIASTOLIC BLOOD PRESSURE: 86 MMHG | BODY MASS INDEX: 24.41 KG/M2 | HEIGHT: 64 IN | WEIGHT: 143 LBS

## 2025-07-03 DIAGNOSIS — H66.92 ACUTE LEFT OTITIS MEDIA: Primary | ICD-10-CM

## 2025-07-03 RX ORDER — AZITHROMYCIN 250 MG/1
TABLET, FILM COATED ORAL
Qty: 6 TABLET | Refills: 0 | Status: SHIPPED | OUTPATIENT
Start: 2025-07-03

## 2025-07-03 RX ORDER — PREDNISONE 20 MG/1
20 TABLET ORAL DAILY
Qty: 5 TABLET | Refills: 0 | Status: SHIPPED | OUTPATIENT
Start: 2025-07-03 | End: 2025-07-08

## 2025-07-03 ASSESSMENT — ENCOUNTER SYMPTOMS
CHILLS: 0
SORE THROAT: 0
RHINORRHEA: 1
SHORTNESS OF BREATH: 0
SINUS PRESSURE: 0
FEVER: 0
WHEEZING: 0
COUGH: 1
SINUS PAIN: 0
ABDOMINAL PAIN: 0

## 2025-07-03 NOTE — PROGRESS NOTES
"Subjective   Patient ID: Kerri Comer is a 54 y.o. female. They present today with a chief complaint of Earache (Earache left side. Sharp stabbing pain. Taking allergy medicine and \"ear ache\" drops).    History of Present Illness    HPI    Patient presents for potential ear infection. Left ear has been aching for the past 3 days. She has had a sharp stabbing pain. She has allergies, congestion, runny nose, PND, and slight cough for which she takes claritin daily. No fever/chills. No other issues or concerns.    Past Medical History  Allergies as of 07/03/2025 - Reviewed 07/03/2025   Allergen Reaction Noted    Cephalexin Anaphylaxis 01/04/2011    Penicillins Anaphylaxis 12/30/2009    Iodine Other 09/21/2021    Latex Unknown 12/20/2024    Varenicline Other 05/12/2023       Prescriptions Prior to Admission[1]     Medical History[2]    Surgical History[3]     reports that she has been smoking cigarettes. She has a 30 pack-year smoking history. She has never used smokeless tobacco. She reports current alcohol use of about 5.0 standard drinks of alcohol per week. She reports that she does not currently use drugs.    Review of Systems  Review of Systems   Constitutional:  Negative for chills and fever.   HENT:  Positive for congestion, ear pain, postnasal drip and rhinorrhea. Negative for sinus pressure, sinus pain and sore throat.    Respiratory:  Positive for cough. Negative for shortness of breath and wheezing.    Cardiovascular:  Negative for chest pain.   Gastrointestinal:  Negative for abdominal pain.                                  Objective    Vitals:    07/03/25 0855   BP: 150/86   Pulse: 85   Resp: 18   Temp: 36.8 °C (98.2 °F)   TempSrc: Oral   SpO2: 98%   Weight: 64.9 kg (143 lb)   Height: 1.626 m (5' 4\")     No LMP recorded. Patient is postmenopausal.    Physical Exam  Constitutional:       General: She is not in acute distress.     Appearance: Normal appearance. She is not ill-appearing or toxic-appearing. " · Statin has been on hold due to rhabdo   HENT:      Head: Normocephalic and atraumatic.      Right Ear: Hearing and external ear normal. A middle ear effusion is present. Tympanic membrane is erythematous and bulging.      Left Ear: Hearing and external ear normal. A middle ear effusion is present. Tympanic membrane is not erythematous or bulging.      Nose: Congestion and rhinorrhea present.      Mouth/Throat:      Lips: Pink.      Mouth: Mucous membranes are moist.      Pharynx: Postnasal drip present. No pharyngeal swelling, oropharyngeal exudate or posterior oropharyngeal erythema.   Cardiovascular:      Rate and Rhythm: Normal rate and regular rhythm.      Heart sounds: Normal heart sounds.   Pulmonary:      Effort: Pulmonary effort is normal. No respiratory distress.      Breath sounds: Normal breath sounds.   Skin:     General: Skin is warm and dry.   Neurological:      General: No focal deficit present.      Mental Status: She is alert.   Psychiatric:         Attention and Perception: Attention normal.         Mood and Affect: Mood normal.         Speech: Speech normal.         Behavior: Behavior normal.         Procedures    Point of Care Test & Imaging Results from this visit  No results found for this visit on 07/03/25.   Imaging  No results found.    Cardiology, Vascular, and Other Imaging  No other imaging results found for the past 2 days      Diagnostic study results (if any) were reviewed by DELMA Cox.    Assessment/Plan   Allergies, medications, history, and pertinent labs/EKGs/Imaging reviewed by DELMA Cox.     Medical Decision Making  Symptoms consistent with acute otitis media, rx for zpak and prednisone. Continue conservative management.   At time of discharge patient was clinically well-appearing and HDS for outpatient management. She was educated regarding diagnosis, supportive care, OTC and Rx medications. She was given the opportunity to ask questions prior to discharge.  They verbalized  understanding of my discussion of the plans for treatment, expected course, indications to return to  or seek further evaluation in ED, and the need for timely follow up as directed.         Orders and Diagnoses  Diagnoses and all orders for this visit:  Acute left otitis media  -     azithromycin (Zithromax) 250 mg tablet; Take 2 tablets by mouth once today. Then take 1 tablet by mouth on days 2-5.  -     predniSONE (Deltasone) 20 mg tablet; Take 1 tablet (20 mg) by mouth once daily for 5 days.      Medical Admin Record      Patient disposition: Home    Electronically signed by DELMA Cox  9:21 AM           [1] (Not in a hospital admission)   [2]   Past Medical History:  Diagnosis Date    Allergic     Angina pectoris, unspecified 09/24/2021    Typical angina    Elevated blood-pressure reading, without diagnosis of hypertension 09/20/2021    Blood pressure elevated without history of HTN    Hypertension sept 2022    Personal history of other diseases of the female genital tract 06/21/2021    History of postmenopausal bleeding    Personal history of other medical treatment     History of screening mammography    Personal history of other specified conditions 06/06/2019    History of lymphadenopathy    Personal history of other specified conditions 09/20/2021    History of chest pain   [3]   Past Surgical History:  Procedure Laterality Date    BREAST BIOPSY Right 2013    excisional bx benign    BREAST SURGERY Bilateral     IMPLANTS    OTHER SURGICAL HISTORY  06/06/2019    Breast biopsy    OTHER SURGICAL HISTORY  06/06/2019    Tubal ligation    OTHER SURGICAL HISTORY  06/06/2019    Breast augmentation    TUBAL LIGATION      WISDOM TOOTH EXTRACTION

## 2025-08-12 ENCOUNTER — PATIENT OUTREACH (OUTPATIENT)
Dept: PRIMARY CARE | Facility: CLINIC | Age: 55
End: 2025-08-12
Payer: COMMERCIAL

## 2025-09-05 ENCOUNTER — PATIENT OUTREACH (OUTPATIENT)
Dept: PRIMARY CARE | Facility: CLINIC | Age: 55
End: 2025-09-05
Payer: COMMERCIAL

## 2025-09-29 ENCOUNTER — APPOINTMENT (OUTPATIENT)
Dept: PRIMARY CARE | Facility: CLINIC | Age: 55
End: 2025-09-29
Payer: COMMERCIAL

## 2025-11-13 ENCOUNTER — APPOINTMENT (OUTPATIENT)
Dept: UROLOGY | Facility: CLINIC | Age: 55
End: 2025-11-13
Payer: COMMERCIAL

## 2026-06-12 ENCOUNTER — APPOINTMENT (OUTPATIENT)
Dept: PRIMARY CARE | Facility: CLINIC | Age: 56
End: 2026-06-12
Payer: COMMERCIAL

## (undated) DEVICE — SOLUTION, IRRIGATION, STERILE WATER, 1000 ML, POUR BOTTLE

## (undated) DEVICE — DRAPE, SHEET, 17 X 23 IN

## (undated) DEVICE — Device

## (undated) DEVICE — DRESSING, NON-ADHERENT, 3 X 3 IN, STERILE

## (undated) DEVICE — BANDAGE, GAUZE, 6 PLY, KERLIX, 4.5 IN X 4.1 YD, AMD, STERILE

## (undated) DEVICE — APPLICATOR, CHLORAPREP, W/ORANGE TINT, 26ML

## (undated) DEVICE — SUTURE, ETHILON, 3-0, 18 IN, PS2, BLACK

## (undated) DEVICE — SOLUTION, IRRIGATION, SODIUM CHLORIDE 0.9%, 1000 ML, POUR BOTTLE

## (undated) DEVICE — BANDAGE, COHESIVE, HAND TEAR, COFLEX, 2 X 5 YDS, LF

## (undated) DEVICE — TOWEL PACK, STERILE, 4/PACK, BLUE

## (undated) DEVICE — SUTURE, ETHILON, 4-0, BLK, MONO, PS-2 18

## (undated) DEVICE — DRESSING, GAUZE, SUPER KERLIX, 6X6

## (undated) DEVICE — GLOVE, SURGICAL, PROTEXIS PI MICRO, 7.0, PF, LF

## (undated) DEVICE — GLOVE, SURGICAL, PROTEXIS PI MICRO, 7.5, PF, LF